# Patient Record
Sex: FEMALE | Race: WHITE | NOT HISPANIC OR LATINO | Employment: OTHER | URBAN - METROPOLITAN AREA
[De-identification: names, ages, dates, MRNs, and addresses within clinical notes are randomized per-mention and may not be internally consistent; named-entity substitution may affect disease eponyms.]

---

## 2023-02-15 RX ORDER — MULTIVITAMIN
1 TABLET ORAL DAILY
COMMUNITY

## 2023-02-15 RX ORDER — LISINOPRIL 20 MG/1
20 TABLET ORAL DAILY
COMMUNITY
Start: 2020-08-24 | End: 2023-03-16 | Stop reason: SDUPTHER

## 2023-02-15 RX ORDER — ACETAMINOPHEN 160 MG/5ML
200 SUSPENSION, ORAL (FINAL DOSE FORM) ORAL DAILY
COMMUNITY
End: 2024-02-13 | Stop reason: WASHOUT

## 2023-02-15 RX ORDER — CALCIUM/MAGNESIUM 300-300 MG
1 TABLET ORAL DAILY
COMMUNITY

## 2023-02-15 RX ORDER — EZETIMIBE 10 MG/1
10 TABLET ORAL EVERY OTHER DAY
COMMUNITY
End: 2023-03-16 | Stop reason: SDUPTHER

## 2023-02-15 RX ORDER — LEVOTHYROXINE SODIUM 50 UG/1
50 TABLET ORAL DAILY
COMMUNITY
Start: 2017-12-11 | End: 2023-04-03 | Stop reason: SDUPTHER

## 2023-02-15 RX ORDER — GLUCOSAMINE/CHONDR SU A SOD 167-133 MG
500 CAPSULE ORAL
COMMUNITY

## 2023-02-15 RX ORDER — METRONIDAZOLE 7.5 MG/G
1 GEL TOPICAL 2 TIMES DAILY
COMMUNITY
Start: 2016-07-25 | End: 2023-04-03 | Stop reason: SDUPTHER

## 2023-02-27 PROBLEM — K63.5 COLON POLYPS: Status: ACTIVE | Noted: 2023-02-27

## 2023-02-27 PROBLEM — R73.01 FASTING HYPERGLYCEMIA: Status: ACTIVE | Noted: 2023-02-27

## 2023-02-27 PROBLEM — D12.5 ADENOMATOUS POLYP OF SIGMOID COLON: Status: ACTIVE | Noted: 2023-02-27

## 2023-02-27 PROBLEM — M79.672 PAIN OF LEFT HEEL: Status: ACTIVE | Noted: 2023-02-27

## 2023-02-27 PROBLEM — I10 BENIGN ESSENTIAL HTN: Status: ACTIVE | Noted: 2023-02-27

## 2023-02-27 PROBLEM — E06.9 THYROIDITIS: Status: ACTIVE | Noted: 2023-02-27

## 2023-02-27 PROBLEM — M19.039 DJD (DEGENERATIVE JOINT DISEASE) OF WRIST: Status: ACTIVE | Noted: 2023-02-27

## 2023-02-27 PROBLEM — M62.830 BACK MUSCLE SPASM: Status: ACTIVE | Noted: 2023-02-27

## 2023-02-27 PROBLEM — E78.00 ELEVATED LOW DENSITY LIPOPROTEIN (LDL) CHOLESTEROL LEVEL: Status: ACTIVE | Noted: 2023-02-27

## 2023-02-27 PROBLEM — E06.3 HYPOTHYROIDISM DUE TO HASHIMOTO'S THYROIDITIS: Status: ACTIVE | Noted: 2023-02-27

## 2023-02-27 PROBLEM — H69.91 DYSFUNCTION OF RIGHT EUSTACHIAN TUBE: Status: ACTIVE | Noted: 2023-02-27

## 2023-02-27 PROBLEM — L71.9 ROSACEA: Status: ACTIVE | Noted: 2023-02-27

## 2023-02-27 PROBLEM — N18.31 STAGE 3A CHRONIC KIDNEY DISEASE (MULTI): Status: ACTIVE | Noted: 2023-02-27

## 2023-02-27 PROBLEM — E66.811 OBESITY, CLASS I, BMI 30.0-34.9 (SEE ACTUAL BMI): Status: ACTIVE | Noted: 2023-02-27

## 2023-02-27 PROBLEM — M85.88 OSTEOPENIA OF LUMBAR SPINE: Status: ACTIVE | Noted: 2023-02-27

## 2023-02-27 PROBLEM — E03.8 HYPOTHYROIDISM DUE TO HASHIMOTO'S THYROIDITIS: Status: ACTIVE | Noted: 2023-02-27

## 2023-02-27 PROBLEM — M85.852 OSTEOPENIA OF LEFT HIP: Status: ACTIVE | Noted: 2023-02-27

## 2023-02-27 PROBLEM — M41.9 SCOLIOSIS: Status: ACTIVE | Noted: 2023-02-27

## 2023-02-27 PROBLEM — M19.042 OSTEOARTHRITIS OF FINGER OF LEFT HAND: Status: ACTIVE | Noted: 2023-02-27

## 2023-02-27 PROBLEM — E78.5 HYPERLIPIDEMIA: Status: ACTIVE | Noted: 2023-02-27

## 2023-02-27 PROBLEM — Z91.199 NON-COMPLIANCE: Status: ACTIVE | Noted: 2023-02-27

## 2023-02-27 PROBLEM — R01.1 SYSTOLIC MURMUR: Status: ACTIVE | Noted: 2023-02-27

## 2023-02-27 PROBLEM — E66.9 OBESITY, CLASS I, BMI 30.0-34.9 (SEE ACTUAL BMI): Status: ACTIVE | Noted: 2023-02-27

## 2023-02-27 RX ORDER — VIT C/E/ZN/COPPR/LUTEIN/ZEAXAN 250MG-90MG
1 CAPSULE ORAL DAILY
COMMUNITY

## 2023-03-10 LAB
ALANINE AMINOTRANSFERASE (SGPT) (U/L) IN SER/PLAS: 15 U/L (ref 7–45)
ALBUMIN (G/DL) IN SER/PLAS: 4.2 G/DL (ref 3.4–5)
ALBUMIN (MG/L) IN URINE: <7 MG/L
ALBUMIN/CREATININE (UG/MG) IN URINE: NORMAL UG/MG CRT (ref 0–30)
ALKALINE PHOSPHATASE (U/L) IN SER/PLAS: 55 U/L (ref 33–136)
ANION GAP IN SER/PLAS: 10 MMOL/L (ref 10–20)
ASPARTATE AMINOTRANSFERASE (SGOT) (U/L) IN SER/PLAS: 17 U/L (ref 9–39)
BILIRUBIN TOTAL (MG/DL) IN SER/PLAS: 0.3 MG/DL (ref 0–1.2)
CALCIDIOL (25 OH VITAMIN D3) (NG/ML) IN SER/PLAS: 60 NG/ML
CALCIUM (MG/DL) IN SER/PLAS: 9.9 MG/DL (ref 8.6–10.6)
CARBON DIOXIDE, TOTAL (MMOL/L) IN SER/PLAS: 29 MMOL/L (ref 21–32)
CHLORIDE (MMOL/L) IN SER/PLAS: 106 MMOL/L (ref 98–107)
CHOLESTEROL (MG/DL) IN SER/PLAS: 267 MG/DL (ref 0–199)
CHOLESTEROL IN HDL (MG/DL) IN SER/PLAS: 66.2 MG/DL
CHOLESTEROL/HDL RATIO: 4
COBALAMIN (VITAMIN B12) (PG/ML) IN SER/PLAS: 910 PG/ML (ref 211–911)
CREATININE (MG/DL) IN SER/PLAS: 0.85 MG/DL (ref 0.5–1.05)
CREATININE (MG/DL) IN URINE: 99.1 MG/DL (ref 20–320)
ERYTHROCYTE DISTRIBUTION WIDTH (RATIO) BY AUTOMATED COUNT: 12.7 % (ref 11.5–14.5)
ERYTHROCYTE MEAN CORPUSCULAR HEMOGLOBIN CONCENTRATION (G/DL) BY AUTOMATED: 31.8 G/DL (ref 32–36)
ERYTHROCYTE MEAN CORPUSCULAR VOLUME (FL) BY AUTOMATED COUNT: 96 FL (ref 80–100)
ERYTHROCYTES (10*6/UL) IN BLOOD BY AUTOMATED COUNT: 4.84 X10E12/L (ref 4–5.2)
GFR FEMALE: 73 ML/MIN/1.73M2
GLUCOSE (MG/DL) IN SER/PLAS: 124 MG/DL (ref 74–99)
HEMATOCRIT (%) IN BLOOD BY AUTOMATED COUNT: 46.6 % (ref 36–46)
HEMOGLOBIN (G/DL) IN BLOOD: 14.8 G/DL (ref 12–16)
LDL: 179 MG/DL (ref 0–99)
LEUKOCYTES (10*3/UL) IN BLOOD BY AUTOMATED COUNT: 6.9 X10E9/L (ref 4.4–11.3)
NRBC (PER 100 WBCS) BY AUTOMATED COUNT: 0 /100 WBC (ref 0–0)
PLATELETS (10*3/UL) IN BLOOD AUTOMATED COUNT: 295 X10E9/L (ref 150–450)
POTASSIUM (MMOL/L) IN SER/PLAS: 4.1 MMOL/L (ref 3.5–5.3)
PROTEIN TOTAL: 7.1 G/DL (ref 6.4–8.2)
SODIUM (MMOL/L) IN SER/PLAS: 141 MMOL/L (ref 136–145)
THYROTROPIN (MIU/L) IN SER/PLAS BY DETECTION LIMIT <= 0.05 MIU/L: 4.32 MIU/L (ref 0.44–3.98)
THYROXINE (T4) FREE (NG/DL) IN SER/PLAS: 1.22 NG/DL (ref 0.78–1.48)
TRIGLYCERIDE (MG/DL) IN SER/PLAS: 109 MG/DL (ref 0–149)
UREA NITROGEN (MG/DL) IN SER/PLAS: 24 MG/DL (ref 6–23)
VLDL: 22 MG/DL (ref 0–40)

## 2023-03-16 DIAGNOSIS — I10 BENIGN ESSENTIAL HTN: Primary | ICD-10-CM

## 2023-03-16 DIAGNOSIS — E78.5 HYPERLIPIDEMIA, UNSPECIFIED HYPERLIPIDEMIA TYPE: ICD-10-CM

## 2023-03-16 RX ORDER — EZETIMIBE 10 MG/1
10 TABLET ORAL EVERY OTHER DAY
Qty: 30 TABLET | Refills: 0 | Status: SHIPPED | OUTPATIENT
Start: 2023-03-16 | End: 2023-04-03 | Stop reason: SDUPTHER

## 2023-03-16 RX ORDER — LISINOPRIL 20 MG/1
20 TABLET ORAL DAILY
Qty: 30 TABLET | Refills: 0 | Status: SHIPPED | OUTPATIENT
Start: 2023-03-16 | End: 2023-04-03 | Stop reason: SDUPTHER

## 2023-04-03 ENCOUNTER — OFFICE VISIT (OUTPATIENT)
Dept: PRIMARY CARE | Facility: CLINIC | Age: 73
End: 2023-04-03
Payer: COMMERCIAL

## 2023-04-03 VITALS
WEIGHT: 179.7 LBS | HEART RATE: 72 BPM | DIASTOLIC BLOOD PRESSURE: 78 MMHG | HEIGHT: 63 IN | RESPIRATION RATE: 22 BRPM | SYSTOLIC BLOOD PRESSURE: 140 MMHG | BODY MASS INDEX: 31.84 KG/M2

## 2023-04-03 DIAGNOSIS — E03.8 HYPOTHYROIDISM DUE TO HASHIMOTO'S THYROIDITIS: ICD-10-CM

## 2023-04-03 DIAGNOSIS — E78.2 MIXED HYPERLIPIDEMIA: ICD-10-CM

## 2023-04-03 DIAGNOSIS — N18.31 STAGE 3A CHRONIC KIDNEY DISEASE (MULTI): ICD-10-CM

## 2023-04-03 DIAGNOSIS — R73.01 FASTING HYPERGLYCEMIA: ICD-10-CM

## 2023-04-03 DIAGNOSIS — L71.9 ROSACEA: ICD-10-CM

## 2023-04-03 DIAGNOSIS — N81.4 UTERINE PROLAPSE: ICD-10-CM

## 2023-04-03 DIAGNOSIS — Z12.31 VISIT FOR SCREENING MAMMOGRAM: ICD-10-CM

## 2023-04-03 DIAGNOSIS — I10 BENIGN ESSENTIAL HTN: ICD-10-CM

## 2023-04-03 DIAGNOSIS — R89.9 ABNORMAL LABORATORY TEST RESULT: ICD-10-CM

## 2023-04-03 DIAGNOSIS — Z00.01 ANNUAL VISIT FOR GENERAL ADULT MEDICAL EXAMINATION WITH ABNORMAL FINDINGS: Primary | ICD-10-CM

## 2023-04-03 DIAGNOSIS — E78.00 ELEVATED LOW DENSITY LIPOPROTEIN (LDL) CHOLESTEROL LEVEL: ICD-10-CM

## 2023-04-03 DIAGNOSIS — E78.5 HYPERLIPIDEMIA, UNSPECIFIED HYPERLIPIDEMIA TYPE: ICD-10-CM

## 2023-04-03 DIAGNOSIS — E06.3 HYPOTHYROIDISM DUE TO HASHIMOTO'S THYROIDITIS: ICD-10-CM

## 2023-04-03 PROCEDURE — 1170F FXNL STATUS ASSESSED: CPT | Performed by: INTERNAL MEDICINE

## 2023-04-03 PROCEDURE — 3077F SYST BP >= 140 MM HG: CPT | Performed by: INTERNAL MEDICINE

## 2023-04-03 PROCEDURE — 1157F ADVNC CARE PLAN IN RCRD: CPT | Performed by: INTERNAL MEDICINE

## 2023-04-03 PROCEDURE — 3078F DIAST BP <80 MM HG: CPT | Performed by: INTERNAL MEDICINE

## 2023-04-03 PROCEDURE — 99397 PER PM REEVAL EST PAT 65+ YR: CPT | Performed by: INTERNAL MEDICINE

## 2023-04-03 PROCEDURE — 1160F RVW MEDS BY RX/DR IN RCRD: CPT | Performed by: INTERNAL MEDICINE

## 2023-04-03 PROCEDURE — 1159F MED LIST DOCD IN RCRD: CPT | Performed by: INTERNAL MEDICINE

## 2023-04-03 PROCEDURE — 99213 OFFICE O/P EST LOW 20 MIN: CPT | Performed by: INTERNAL MEDICINE

## 2023-04-03 PROCEDURE — 1036F TOBACCO NON-USER: CPT | Performed by: INTERNAL MEDICINE

## 2023-04-03 RX ORDER — LISINOPRIL 20 MG/1
20 TABLET ORAL DAILY
Qty: 90 TABLET | Refills: 3 | Status: SHIPPED | OUTPATIENT
Start: 2023-04-03 | End: 2023-04-20

## 2023-04-03 RX ORDER — LEVOTHYROXINE SODIUM 50 UG/1
50 TABLET ORAL DAILY
Qty: 90 TABLET | Refills: 3 | Status: SHIPPED | OUTPATIENT
Start: 2023-04-03 | End: 2024-04-23

## 2023-04-03 RX ORDER — METRONIDAZOLE 7.5 MG/G
1 GEL TOPICAL 2 TIMES DAILY
Qty: 45 G | Refills: 3 | Status: SHIPPED | OUTPATIENT
Start: 2023-04-03

## 2023-04-03 RX ORDER — EZETIMIBE 10 MG/1
10 TABLET ORAL EVERY OTHER DAY
Qty: 45 TABLET | Refills: 3 | Status: SHIPPED | OUTPATIENT
Start: 2023-04-03 | End: 2024-04-23

## 2023-04-03 ASSESSMENT — VISUAL ACUITY
OD_CC: 20/20
OS_CC: 20/25

## 2023-04-03 ASSESSMENT — ACTIVITIES OF DAILY LIVING (ADL)
FEEDING YOURSELF: INDEPENDENT
HEARING - RIGHT EAR: FUNCTIONAL
HEARING - LEFT EAR: FUNCTIONAL
BATHING: INDEPENDENT
ADEQUATE_TO_COMPLETE_ADL: YES
WALKS IN HOME: INDEPENDENT
DRESSING YOURSELF: INDEPENDENT
TOILETING: INDEPENDENT
ASSISTIVE_DEVICE: EYEGLASSES
PATIENT'S MEMORY ADEQUATE TO SAFELY COMPLETE DAILY ACTIVITIES?: YES
GROOMING: INDEPENDENT
JUDGMENT_ADEQUATE_SAFELY_COMPLETE_DAILY_ACTIVITIES: YES

## 2023-04-03 ASSESSMENT — ENCOUNTER SYMPTOMS
LOSS OF SENSATION IN FEET: 0
DEPRESSION: 0
OCCASIONAL FEELINGS OF UNSTEADINESS: 0

## 2023-04-03 ASSESSMENT — PAIN SCALES - GENERAL: PAINLEVEL: 0-NO PAIN

## 2023-04-03 ASSESSMENT — PATIENT HEALTH QUESTIONNAIRE - PHQ9
1. LITTLE INTEREST OR PLEASURE IN DOING THINGS: NOT AT ALL
SUM OF ALL RESPONSES TO PHQ9 QUESTIONS 1 AND 2: 0
2. FEELING DOWN, DEPRESSED OR HOPELESS: NOT AT ALL

## 2023-04-03 ASSESSMENT — COLUMBIA-SUICIDE SEVERITY RATING SCALE - C-SSRS
2. HAVE YOU ACTUALLY HAD ANY THOUGHTS OF KILLING YOURSELF?: NO
6. HAVE YOU EVER DONE ANYTHING, STARTED TO DO ANYTHING, OR PREPARED TO DO ANYTHING TO END YOUR LIFE?: NO
1. IN THE PAST MONTH, HAVE YOU WISHED YOU WERE DEAD OR WISHED YOU COULD GO TO SLEEP AND NOT WAKE UP?: NO

## 2023-04-03 NOTE — PROGRESS NOTES
Subjective   Patient ID: Savanah Hernandez is a 72 y.o. female who presents for annual physical and follow up on conditions.  LAST VISIT PLAN 10/3/22  Patient Discussion/Summary     TESTING:Please obtain blood work as soon as possible. Your last thyroid medication monitoring test was one and 1/2 years ago, it is recommended at least every 12 months.     MEDICATIONS:  Vaccination against influenza is recommended.  Vaccination against covid 19 is recommended. Since you had covid in august, you should have protection against omicron B4/5 for a few months.     INSTRUCTIONS:  You are encouraged to drink 64 oz of water per day. 1 or 2 cups of herbal tea could be counted toward the water intake.     A diet that is low in sugars, refined grains and processed foods is recommended.  Exercise at least 30 minutes per day is also recommended.        FOLLOW UP WITH DR. WALTER:  Next visit:  annual march   continue bp monitoring at home.  Provider Impressions     htn Stable, no medication changes.Follow up planned, see patient discussion.  arthritic nodule wrist, observe, offered xray, but it does not bother her much, if worsens should xray,   talus djd nodule, discussed caution with achilles, would see specialist if changing to more supportive shoes did not help     CKD, stage 3a-unkonwn status, labs due 6 months ago, requested pt get these done. she is not taking nsaids.     HTN Stable bp and she monitors at home. suggest ck at least monthly. asymp.  , -labs were due in april for this, asked her to please complete  hypothyroid, no labs done for a year and 1/2, stressed importance of monitoring thyroid dose at least every 12 months.        Osteopenia -exercise vit d calcium, labs, dexa q2y  END INFO FROM PRIOR VISIT  HPI  She is here for her annual physical and follo wup on hypertension, high cholesterol,hypothyroidism, lab review, and medication review.  Health maintenance items last completed:  Colonoscopy: 7/2021-  polyps  Mammogram: 5/2022 - neg  Bone Density: 3/2022- osteopenia  Urine albumin if HTN or DM2: 3/10/23 - <7.0 mg/L  Pap: 2/2020- neg-always normal  Pelvic:2/2020=she would prefer not doing these as a screening exam anymore and i am ok with that.  Breast exam in office: today  Vaccines due or not completed: flu, covid, tdap (bad reaction to TD)  Last Health Maintenance exam: 3/30/22    Labs :  Chol is up: states she Was off zetia for couple of months and cholesterol is high.She is back on it now.CT scan for cac  score was zero 2022  Sugar is up.will ck a1c, normal in the past    She missed 7 days of her thyroid medication in the 2 months prior to her blood test which may explain the tsh up some so will keep dose the same and mack labs    She notes something has dropped in her vaginal area. Will ck on pelvic. No incontinence.    She requests refill on medication for her rosacea , it is working well.    Review of Systems  All systems reviewed and are negative unless otherwise stated in HPI.   Review of systems, written document, scanned in to office visit.  I reviewed 7 page history and review of systems form.    Objective    Latest Reference Range & Units 03/10/23 10:52   GLUCOSE 74 - 99 mg/dL 124 (H)   SODIUM 136 - 145 mmol/L 141   POTASSIUM 3.5 - 5.3 mmol/L 4.1   CHLORIDE 98 - 107 mmol/L 106   Bicarbonate 21 - 32 mmol/L 29   Anion Gap 10 - 20 mmol/L 10   Blood Urea Nitrogen 6 - 23 mg/dL 24 (H)   Creatinine 0.50 - 1.05 mg/dL 0.85   Calcium 8.6 - 10.6 mg/dL 9.9   Albumin 3.4 - 5.0 g/dL 4.2   Alkaline Phosphatase 33 - 136 U/L 55   ALT 7 - 45 U/L 15   AST 9 - 39 U/L 17   Bilirubin Total 0.0 - 1.2 mg/dL 0.3   HDL CHOLESTEROL mg/dL 66.2   Cholesterol/HDL Ratio  4.0   VLDL 0 - 40 mg/dL 22   TRIGLYCERIDES 0 - 149 mg/dL 109   Total Protein 6.4 - 8.2 g/dL 7.1   CHOLESTEROL 0 - 199 mg/dL 267 (H)   LDL 0 - 99 mg/dL 179 (H)   Vitamin B12 211 - 911 pg/mL 910   Thyroxine, Free 0.78 - 1.48 ng/dL 1.22   Thyroid Stimulating Hormone  "0.44 - 3.98 mIU/L 4.32 (H)   Vitamin D, 25-Hydroxy, Total ng/mL 60   WBC 4.4 - 11.3 x10E9/L 6.9   RBC 4.00 - 5.20 x10E12/L 4.84   HEMOGLOBIN 12.0 - 16.0 g/dL 14.8   HEMATOCRIT 36.0 - 46.0 % 46.6 (H)   MCV 80 - 100 fL 96   MCHC 32.0 - 36.0 g/dL 31.8 (L)   Platelets 150 - 450 x10E9/L 295   nRBC 0.0 - 0.0 /100 WBC 0.0   RED CELL DISTRIBUTION WIDTH 11.5 - 14.5 % 12.7   Creatinine, Urine Random 20.0 - 320.0 mg/dL 99.1   Albumin/Creatine Ratio 0.0 - 30.0 ug/mg crt SEE COMMENT   ALBUMIN (MG/L) IN URINE Not Established mg/L <7.0   GFR Female >90 mL/min/1.73m2 73   (H): Data is abnormally high  (L): Data is abnormally low      Physical Exam  /78 (BP Location: Left arm, Patient Position: Sitting, BP Cuff Size: Adult)   Pulse 72   Resp 22   Ht 1.6 m (5' 3\")   Wt 81.5 kg (179 lb 11.2 oz)   LMP 04/03/2003 (Approximate)   BMI 31.83 kg/m²     General: Patient is known to me, looks well, is in usual state of health, with  no obvious distress.  Head: normocephalic  Eyes: PERRL, EOMI, no scleral icterus or conjunctival abnormality  Ears:Normal canals and TM's  Oropharynx: Well hydrated mucosa. no lesions, erythema. palate moves well.--mask removed  Neck: No masses, no cervical (A/P/ or Lateral) adenopathy. Mildly prominent left submandibular gland, she had uri few weeks ago all symptoms resolved, was just ST an dpost nasal drainage. This is not a mass or worrisome. Not tender.  Thyroid not enlarged and no nodules. Carotid pulses normal and no bruits.  Chest: Normal AP diameter. No supraclavicular adenopathy.  Lungs: Clear to auscultation: no rales , wheezes, rhonchi, rubs, examined bilaterally, ant/post /lateral. RR normal.  Heart: RRR. No MGCR S1 S2 normal.  Abd: BS normal, no bruits. No hepatosplenomegaly. No abdominal mass or tenderness. No distension.  Extremities: No upper extremity edema. No lower leg edema.No ischemia.   Joints: no synovitis seen. No deformities.  Pulses: normal posterior tibialis pulses, normal " dorsalis pedis pulses. normal radial pulses. Normal femoral pulses without bruits.  Neuro: CN 2-12 intact . Alert, oriented, appropriate.  No focal weakness observed. No tremors. Ambulation is normal .  Psych: Mood and affect normal. No cognitive deficits noted during this exam. Alert, oriented, appropriate.  Skin: No rash, petechiae or excessive bruising.  Lymph: no SC axillary or inguinal adenopathy  Breast Exam : Symmetric appearance. No masses, nipple discharge, skin retraction, axillary or supraclavicular adenopathy.  Gyn: pelvic exam: External Genitalia without lesions. Urethra normal. Speculum exam: no lesions or vaginal discharge, cervix without lesions. Bimanual exam: no uterine masses. No ovarian masses. No anal/perineal lesions. Recto vaginal exam normal. (Pt declined chaperone)  Moderately severe uterine prolapse that is reducible and cystocoele noted.          Assessment/Plan   Problem List Items Addressed This Visit          Circulatory    Benign essential HTN    Relevant Medications    lisinopril 20 mg tablet       Genitourinary    Stage 3a chronic kidney disease       Endocrine/Metabolic    Hypothyroidism due to Hashimoto's thyroiditis    Relevant Medications    levothyroxine (Synthroid, Levoxyl) 50 mcg tablet       Other    Elevated low density lipoprotein (LDL) cholesterol level    Fasting hyperglycemia    Relevant Orders    Hemoglobin A1c    Hyperlipidemia    Relevant Medications    ezetimibe (Zetia) 10 mg tablet    Other Relevant Orders    Lipid Panel    Aspartate Aminotransferase    Alanine Aminotransferase    TSH with reflex to Free T4 if abnormal    Rosacea    Relevant Medications    metroNIDAZOLE (Metrogel) 0.75 % gel     Other Visit Diagnoses       Annual visit for general adult medical examination with abnormal findings    -  Primary    Uterine prolapse        Relevant Orders    Referral to Urogynecology    Visit for screening mammogram        Relevant Orders    BI mammo bilateral screening  tomosynthesis          Annual  history and physical completed including  ROS, PE, review of chronic issues,   immunizations,   results of testing   and health maintenance.  Functionality and pain were assessed.   Cancer screening testing was addressed as appropriate-see patient discussion/orders.   Medications were discussed and reviewed/reconciled and refill need assessed/handled.   Full code.  No alcohol or depression red flags, screens neg.  Medical issues addressed as noted above.  She will get back on track with meds and diet and we will re assess blood work in a few months.    See wrap up section for patient instructions and  additional treatment plan.  We discussed diet for sugar and fats and obesity.  We discussed options for treating prolapse and not all are surgical (she is disinclined for surgical correction), see referral notes. She preferred seeing a female practitioner.

## 2023-04-03 NOTE — PATIENT INSTRUCTIONS
Thank you for coming in for your annual and getting your blood work done.    Restart zetia 10 mg every other day.  We talked about the increased cardiovascular risk of over 20% and recommending a statin medication for cholesterol and to protect blood vessels. You indicated you would think about this.    Thyroid test was mildly off but likely was from missing some of your medication in the past few months, so no medication dose change, keep same dose.     Recheck lipid panel, sugar and thyroid test in 3-4 months.    Mammogram due in may     Follow up in 4 months.  Watch diet for sugar and animal fats.    Referral to calvin Alas.

## 2023-04-04 NOTE — TELEPHONE ENCOUNTER
I called [facility] pharmacy spoke to the pharmacist and she states that the medications are just to soon to fill she last filled meds for 30 day supply on 3/17/23.

## 2023-04-04 NOTE — TELEPHONE ENCOUNTER
I called and notified the pt and suggested she reach out to the pharmacy as to exactly when she can  her medications.

## 2023-04-04 NOTE — TELEPHONE ENCOUNTER
Pt called she stated on my chart that the meds she requested for renewal yesterday at her appoint were unavailable.

## 2023-04-16 PROBLEM — R89.9 ABNORMAL LABORATORY TEST RESULT: Status: ACTIVE | Noted: 2023-04-16

## 2023-07-07 ENCOUNTER — LAB (OUTPATIENT)
Dept: LAB | Facility: LAB | Age: 73
End: 2023-07-07
Payer: COMMERCIAL

## 2023-07-07 DIAGNOSIS — E78.2 MIXED HYPERLIPIDEMIA: ICD-10-CM

## 2023-07-07 DIAGNOSIS — R73.01 FASTING HYPERGLYCEMIA: ICD-10-CM

## 2023-07-07 PROCEDURE — 36415 COLL VENOUS BLD VENIPUNCTURE: CPT

## 2023-07-07 PROCEDURE — 84460 ALANINE AMINO (ALT) (SGPT): CPT

## 2023-07-07 PROCEDURE — 83036 HEMOGLOBIN GLYCOSYLATED A1C: CPT

## 2023-07-07 PROCEDURE — 84450 TRANSFERASE (AST) (SGOT): CPT

## 2023-07-07 PROCEDURE — 84443 ASSAY THYROID STIM HORMONE: CPT

## 2023-07-07 PROCEDURE — 80061 LIPID PANEL: CPT

## 2023-07-08 LAB
ALANINE AMINOTRANSFERASE (SGPT) (U/L) IN SER/PLAS: 15 U/L (ref 7–45)
ASPARTATE AMINOTRANSFERASE (SGOT) (U/L) IN SER/PLAS: 20 U/L (ref 9–39)
CHOLESTEROL (MG/DL) IN SER/PLAS: 242 MG/DL (ref 0–199)
CHOLESTEROL IN HDL (MG/DL) IN SER/PLAS: 61.7 MG/DL
CHOLESTEROL/HDL RATIO: 3.9
ESTIMATED AVERAGE GLUCOSE FOR HBA1C: 111 MG/DL
HEMOGLOBIN A1C/HEMOGLOBIN TOTAL IN BLOOD: 5.5 %
LDL: 159 MG/DL (ref 0–99)
THYROTROPIN (MIU/L) IN SER/PLAS BY DETECTION LIMIT <= 0.05 MIU/L: 3.06 MIU/L (ref 0.44–3.98)
TRIGLYCERIDE (MG/DL) IN SER/PLAS: 108 MG/DL (ref 0–149)
VLDL: 22 MG/DL (ref 0–40)

## 2023-07-12 NOTE — RESULT ENCOUNTER NOTE
TSH is  mildly off, will follow. Cholesterol elevated but is improving. Ast alt normal, A1C normal.Has August appt. To review.

## 2023-08-01 NOTE — PROGRESS NOTES
Subjective   Patient ID: Savanah Hernandez is a 72 y.o. female who presents for Follow-up (Pt here for follow up and review. Pt denies any new problems or concerns at this time).  LAST VISIT PLAN 4/03/2023  PATIENT'S DISCUSSION/SUMMARY:  Thank you for coming in for your annual and getting your blood work done.  Restart zetia 10 mg every other day.  We talked about the increased cardiovascular risk of over 20% and recommending a statin medication for cholesterol and to protect blood vessels. You indicated you would think about this.  Thyroid test was mildly off but likely was from missing some of your medication in the past few months, so no medication dose change, keep same dose.   Recheck lipid panel, sugar and thyroid test in 3-4 months.  Mammogram due in may   Follow up in 4 months.  Watch diet for sugar and animal fats.  Referral to calvin Alas.      PROVIDER'S IMPRESSIONS:  Annual visit for general adult medical examination with abnormal findings  Uterine prolapse  Benign essential HTN  Stage 3a chronic kidney disease  Hypothyroidism due to Hashimoto's thyroiditis  Elevated low density lipoprotein (LDL) cholesterol level  Fasting hyperglycemia  Mixed hyperlipidemia  Hyperlipidemia, unspecified hyperlipidemia type  Rosacea  Visit for screening mammogram  Abnormal laboratory test result  Orders Placed  Lipid Panel  Alanine Aminotransferase  Aspartate Aminotransferase  Hemoglobin A1c  TSH with reflex to Free T4 if abnormal  BI mammo bilateral screening tomosynthesis  Referral to Urogynecology Authorized  Encounters with Related Results  Lab (7/7/2023)  END INFO FROM PRIOR VISIT    HPI  Here to follow up on labs bp, meds.  July 2023:TSH is  mildly off, will follow. Cholesterol elevated but is improving. Ast alt normal, A1C normal.Has August appt. To review.MAMM 5/2023, CT CARDIAC 6/6/22, DEXA 3/10/22,     Did see aleksandr shelton in urology may 2023-consideration pessary surgery estrogen cream, has rx for cream,  wanted to monitor otherwise as not many urinary issues.    Feels well, no complaints.  Scribe Transcription:  She states she will be moving soon to be near her daughter in [place].     Cardiac: No CP , palpitations, increased gates  Resp: No sob, wheezing, cough  Extremities: No leg or ankle swelling, no claudication  Neuro: No dizziness, syncope, blurred vision. No new headaches.     Review of Systems    Objective   Lab Results   Component Value Date    WBC 6.9 03/10/2023    HGB 14.8 03/10/2023    HCT 46.6 (H) 03/10/2023     03/10/2023    CHOL 242 (H) 07/07/2023    TRIG 108 07/07/2023    HDL 61.7 07/07/2023    ALT 15 07/07/2023    AST 20 07/07/2023     03/10/2023    K 4.1 03/10/2023     03/10/2023    CREATININE 0.85 03/10/2023    BUN 24 (H) 03/10/2023    CO2 29 03/10/2023    TSH 3.06 07/07/2023    HGBA1C 5.5 07/07/2023         TSH with reflex to Free T4 if abnormal               Component Ref Range & Units 1 mo ago  (7/7/23) 5 mo ago  (3/10/23) 2 yr ago  (1/27/21) 3 yr ago  (12/20/19) 4 yr ago  (1/15/19) 5 yr ago  (4/17/18) 5 yr ago  (11/17/17)   TSH 0.44 - 3.98 mIU/L 3.06 4.32 High  CM 3.08 CM 1.73 CM 2.30 CM 1.68 CM 3.82 CM   Comment:  TSH testing is performed using different testing   methodology at [facility] than at other   [facility] hospitals. Direct result comparisons should   only be made within the same method.   [facility]  [facility]          Ldl 159 July             Component Ref Range & Units 1 mo ago  (7/7/23) 5 mo ago  (3/10/23) 2 yr ago  (4/23/21) 2 yr ago  (8/11/20) 3 yr ago  (12/20/19) 5 yr ago  (4/17/18) 5 yr ago  (11/17/17)   Cholesterol 0 - 199 mg/dL 242 High  267 High   High   High   High   High   High  CM      HDL mg/dL 61.7 66.2 CM 59.3 CM 57.9 CM 61.1 CM 56.9 CM 55.5 CM   Comment: .      AGE      VERY LOW   LOW     NORMAL    HIGH       0-19 Y       < 35   < 40     40-45     ----    20-24 Y        "----   < 40       >45     ----      >24 Y       ----   < 40     40-60      >60  .   Cholesterol/HDL Ratio  3.9 4.0 CM 3.6 CM 4.0 CM 3.8 CM 5.0 CM 4.7 CM   Comment: REF VALUES  DESIRABLE  < 3.4  HIGH RISK  > 5.0   LDL 0 - 99 mg/dL 159 High  179 High   High   High   High   High   High  CM   Comment: .      Has declined statin. Is aware of inc cv risk discussed last visit.Fortunately CAC score is zero.     Latest Reference Range & Units 07/07/23 11:52   ALT 7 - 45 U/L 15   AST 9 - 39 U/L 20   HDL CHOLESTEROL mg/dL 61.7   Cholesterol/HDL Ratio  3.9   VLDL 0 - 40 mg/dL 22   TRIGLYCERIDES 0 - 149 mg/dL 108   CHOLESTEROL 0 - 199 mg/dL 242 (H)   LDL 0 - 99 mg/dL 159 (H)   Hemoglobin A1C % 5.5   Thyroid Stimulating Hormone 0.44 - 3.98 mIU/L 3.06   Estimated Average Glucose MG/     Normal A1c is stable.  Physical ExamBP 138/78 (BP Location: Right arm, Patient Position: Sitting, BP Cuff Size: Large adult)   Pulse 66   Resp 18   Ht 1.6 m (5' 3\")   Wt 80.3 kg (177 lb)   LMP 04/03/2003 (Approximate)   BMI 31.35 kg/m²       10/3/2022     1:40 PM 10/3/2022     2:07 PM 4/3/2023     2:17 PM 4/3/2023     2:22 PM 4/3/2023     3:32 PM 5/9/2023     1:03 PM 8/8/2023     1:49 PM   Vitals   Systolic 142 137 187 184 140 159 138   Diastolic 84 80 96 93 78 72 78   Heart Rate 68  85 80 72 71 66   Temp      36 °C (96.8 °F)    Resp 18  22    18   Height (in)   1.6 m (5' 3\")   1.6 m (5' 3\") 1.6 m (5' 3\")   Weight (lb) 178  179.7   179.5 177   BMI 30.55 kg/m2  31.83 kg/m2   31.8 kg/m2 31.35 kg/m2   BSA (m2) 1.91 m2  1.9 m2   1.9 m2 1.89 m2   Visit Report   Report Report Report  Report       Patient looks well and is in no obvious distress.  HEENT:   Normocephalic, no facial asymmetry  Eyes: Sclera and conjunctiva are clear.  Neck: No adenopathy cervical (Ant/post/lat), no Supraclavicular nodes.   Thyroid normal.  Carotid pulses normal, no carotid bruits.  Lungs : RR normal. Clear to auscultation anterior, " posterior and lateral. No rales, wheezes rhonchi or rubs. Good air exchange.  Heart: RRR. No Murmur, gallop, click or rub.  Vascular:  Posterior tibialis  pulses within normal limits bilaterally.   Extremities: no upper extremity edema. No lower extremity edema.   Musculoskeletal: no synovitis of joints seen. No new deformity.  Neuro: CN 2-12 intact. Alert, appropriate.  Ambulates independently.  No gross motor deficit.   No tremors.  Psych: normal mood and affect.  Skin: No rash, bruising petechiae or jaundice.    Assessment/Plan   Problem List Items Addressed This Visit       Benign essential HTN - Primary  stable.    Relevant Orders    CBC and Auto Differential    Comprehensive Metabolic Panel    Albumin , Urine Random    Elevated low density lipoprotein (LDL) cholesterol level    Pt prefers to continue to work on dietary treatment of her elevated cholesterol.    Relevant Orders    Lipid Panel    Cholesterol, LDL Direct    Fasting hyperglycemia    Relevant Orders    Hemoglobin A1C    Hypothyroidism due to Hashimoto's thyroiditis   tsh now euthyroid on present dose. Will follow.    Relevant Orders    TSH with reflex to Free T4 if abnormal    Vitamin B12    Stage 3a chronic kidney disease (CMS/HCC)    Relevant Orders    Vitamin D, Total      Pt instructions: Continue medications.  Watch salt, keep up with hydration and exercise.  Check blood pressure monthly: Goal BP at home is 120/70.  Heart Healthy/DASH diet info sent to your email.    Follow up in 6 months for annual and wellness.  Fasting blood test the week before that visit and urine test at the lab.

## 2023-08-08 ENCOUNTER — OFFICE VISIT (OUTPATIENT)
Dept: PRIMARY CARE | Facility: CLINIC | Age: 73
End: 2023-08-08
Payer: COMMERCIAL

## 2023-08-08 VITALS
WEIGHT: 177 LBS | RESPIRATION RATE: 18 BRPM | HEART RATE: 66 BPM | SYSTOLIC BLOOD PRESSURE: 138 MMHG | BODY MASS INDEX: 31.36 KG/M2 | DIASTOLIC BLOOD PRESSURE: 78 MMHG | HEIGHT: 63 IN

## 2023-08-08 DIAGNOSIS — E06.3 HYPOTHYROIDISM DUE TO HASHIMOTO'S THYROIDITIS: ICD-10-CM

## 2023-08-08 DIAGNOSIS — I10 BENIGN ESSENTIAL HTN: Primary | ICD-10-CM

## 2023-08-08 DIAGNOSIS — R73.01 FASTING HYPERGLYCEMIA: ICD-10-CM

## 2023-08-08 DIAGNOSIS — N18.31 STAGE 3A CHRONIC KIDNEY DISEASE (MULTI): ICD-10-CM

## 2023-08-08 DIAGNOSIS — E78.00 ELEVATED LOW DENSITY LIPOPROTEIN (LDL) CHOLESTEROL LEVEL: ICD-10-CM

## 2023-08-08 DIAGNOSIS — E03.8 HYPOTHYROIDISM DUE TO HASHIMOTO'S THYROIDITIS: ICD-10-CM

## 2023-08-08 PROCEDURE — 99214 OFFICE O/P EST MOD 30 MIN: CPT | Performed by: INTERNAL MEDICINE

## 2023-08-08 PROCEDURE — 3075F SYST BP GE 130 - 139MM HG: CPT | Performed by: INTERNAL MEDICINE

## 2023-08-08 PROCEDURE — 1126F AMNT PAIN NOTED NONE PRSNT: CPT | Performed by: INTERNAL MEDICINE

## 2023-08-08 PROCEDURE — 3078F DIAST BP <80 MM HG: CPT | Performed by: INTERNAL MEDICINE

## 2023-08-08 PROCEDURE — 1036F TOBACCO NON-USER: CPT | Performed by: INTERNAL MEDICINE

## 2023-08-08 PROCEDURE — 1160F RVW MEDS BY RX/DR IN RCRD: CPT | Performed by: INTERNAL MEDICINE

## 2023-08-08 PROCEDURE — 1157F ADVNC CARE PLAN IN RCRD: CPT | Performed by: INTERNAL MEDICINE

## 2023-08-08 PROCEDURE — 1159F MED LIST DOCD IN RCRD: CPT | Performed by: INTERNAL MEDICINE

## 2023-08-08 RX ORDER — ESTRADIOL 0.1 MG/G
2 CREAM VAGINAL DAILY
COMMUNITY
Start: 2023-05-09

## 2023-08-08 ASSESSMENT — PAIN SCALES - GENERAL: PAINLEVEL: 0-NO PAIN

## 2023-11-09 ENCOUNTER — OFFICE VISIT (OUTPATIENT)
Dept: UROLOGY | Facility: CLINIC | Age: 73
End: 2023-11-09
Payer: COMMERCIAL

## 2023-11-09 VITALS
WEIGHT: 175 LBS | SYSTOLIC BLOOD PRESSURE: 138 MMHG | DIASTOLIC BLOOD PRESSURE: 77 MMHG | BODY MASS INDEX: 31.01 KG/M2 | TEMPERATURE: 98.2 F | HEIGHT: 63 IN | HEART RATE: 81 BPM

## 2023-11-09 DIAGNOSIS — N81.4 CYSTOCELE WITH PROLAPSE: ICD-10-CM

## 2023-11-09 DIAGNOSIS — N95.8 GENITOURINARY SYNDROME OF MENOPAUSE: ICD-10-CM

## 2023-11-09 DIAGNOSIS — N81.6 RECTOCELE: ICD-10-CM

## 2023-11-09 DIAGNOSIS — N18.30 STAGE 3 CHRONIC KIDNEY DISEASE, UNSPECIFIED WHETHER STAGE 3A OR 3B CKD (MULTI): ICD-10-CM

## 2023-11-09 LAB
POC APPEARANCE, URINE: CLEAR
POC BILIRUBIN, URINE: NEGATIVE
POC BLOOD, URINE: NEGATIVE
POC COLOR, URINE: YELLOW
POC GLUCOSE, URINE: NEGATIVE MG/DL
POC KETONES, URINE: ABNORMAL MG/DL
POC LEUKOCYTES, URINE: NEGATIVE
POC NITRITE,URINE: NEGATIVE
POC PH, URINE: 5.5 PH
POC PROTEIN, URINE: ABNORMAL MG/DL
POC SPECIFIC GRAVITY, URINE: >=1.03
POC UROBILINOGEN, URINE: 1 EU/DL

## 2023-11-09 PROCEDURE — 99213 OFFICE O/P EST LOW 20 MIN: CPT | Performed by: NURSE PRACTITIONER

## 2023-11-09 PROCEDURE — 1036F TOBACCO NON-USER: CPT | Performed by: NURSE PRACTITIONER

## 2023-11-09 PROCEDURE — 1160F RVW MEDS BY RX/DR IN RCRD: CPT | Performed by: NURSE PRACTITIONER

## 2023-11-09 PROCEDURE — 3078F DIAST BP <80 MM HG: CPT | Performed by: NURSE PRACTITIONER

## 2023-11-09 PROCEDURE — 3075F SYST BP GE 130 - 139MM HG: CPT | Performed by: NURSE PRACTITIONER

## 2023-11-09 PROCEDURE — 81003 URINALYSIS AUTO W/O SCOPE: CPT | Performed by: NURSE PRACTITIONER

## 2023-11-09 PROCEDURE — 1159F MED LIST DOCD IN RCRD: CPT | Performed by: NURSE PRACTITIONER

## 2023-11-09 PROCEDURE — 1126F AMNT PAIN NOTED NONE PRSNT: CPT | Performed by: NURSE PRACTITIONER

## 2023-11-09 NOTE — PATIENT INSTRUCTIONS
Discussed monitoring, pessary and surgical correction for prolapse  Emptying bladder, no UTIs, no OAB symptoms  Patient would like to continue to monitor for now  Using vaginal estrogen cream pea size amount couple x per week, has enough and refill at home  Follow up 1 year pelvic exam, PVR UA

## 2023-11-09 NOTE — PROGRESS NOTES
"11/09/23   [id number]    Pelvic exam, prolapse monitoring     Subjective      HPI Savanah Hernandez is a 73 y.o. female who presents for pelvic exam, monitoring; stage II cystocele noted previously in  year, decided last visit to monitor; since then no urinary symptoms, no UTIs, no hematuria, emptying bladder well, although she can feel prolapse at times more now, it is not outside introitus and she would like to continue to monitor for now;     3/10/23 creatinine 0.85    Dr. Puente is her primary care      [facility] patient    History reviewed   PMH: HTN, cholesterol, thyroid  PSH: no surgeries  FH: maternal grandmother breast cancer (older)  SH: retired [employer], never smoked           Objective     /77   Pulse 81   Temp 36.8 °C (98.2 °F)   Ht 1.6 m (5' 3\")   Wt 79.4 kg (175 lb)   LMP 04/03/2003 (Approximate)   BMI 31.00 kg/m²    Physical Exam  Genitourinary:     Comments: Stage II large cystocele, moderate rectocele, moderate atrophy  Neg CST lying down, maybe sl increase since last exam 6 mos ago w cystocele/rectocele        General: Appears comfortable and in no apparent distress, well nourished  Head: Normocephalic, atraumatic  Neck: trachea midline  Respiratory: respirations unlabored, no wheezes, and no use of accessory muscles  Cardiovascular: at rest no dyspnea, well perfused  Skin: no visible rashes or lesions  Neurologic: grossly intact, oriented to person, place, and time  Psychiatric: mood and affect appropriate  Musculoskeletal: in chair for appt. no difficulty w upper body movement        Assessment/Plan   Problem List Items Addressed This Visit    None  Visit Diagnoses       Stage 3 chronic kidney disease, unspecified whether stage 3a or 3b CKD (CMS/HCC)    -  Primary    Relevant Orders    POCT UA Automated manually resulted (Completed)    Post-Void Residual (Completed)          Orders Placed This Encounter   Procedures    Post-Void Residual    POCT UA Automated manually " resulted     Order Specific Question:   Release result to [employer]     Answer:   Immediate [1]      Discussed monitoring, pessary and surgical correction for prolapse  Emptying bladder, no UTIs, no OAB symptoms  Patient would like to continue to monitor for now  Using vaginal estrogen cream pea size amount couple x per week, has enough and refill at home  Follow up 1 year pelvic exam, PVR UA       Danita Gaston, APRN-CNP  Lab Results   Component Value Date    GLUCOSE 124 (H) 03/10/2023    CALCIUM 9.9 03/10/2023     03/10/2023    K 4.1 03/10/2023    CO2 29 03/10/2023     03/10/2023    BUN 24 (H) 03/10/2023    CREATININE 0.85 03/10/2023

## 2024-01-02 DIAGNOSIS — I10 BENIGN ESSENTIAL HTN: ICD-10-CM

## 2024-01-03 RX ORDER — LISINOPRIL 20 MG/1
20 TABLET ORAL DAILY
Qty: 90 TABLET | Refills: 0 | Status: SHIPPED | OUTPATIENT
Start: 2024-01-03 | End: 2024-02-13 | Stop reason: SDUPTHER

## 2024-01-15 ENCOUNTER — TELEPHONE (OUTPATIENT)
Dept: PRIMARY CARE | Facility: CLINIC | Age: 74
End: 2024-01-15
Payer: COMMERCIAL

## 2024-01-15 NOTE — TELEPHONE ENCOUNTER
Pt called and stated she has been experiencing right knee pain since the beginning of December, woke up one morning and had the pain. When pt walks she feels the pain, when she sits down she does not. Pt would like to make an appt with N[facility].   Pt # [phone]

## 2024-01-17 DIAGNOSIS — M25.561 RIGHT KNEE PAIN, UNSPECIFIED CHRONICITY: Primary | ICD-10-CM

## 2024-01-17 NOTE — TELEPHONE ENCOUNTER
Patient calling again. Has not heard back. Can she hear today. Leaving tomorrow and will not be back for a week.  [phone]

## 2024-01-30 ENCOUNTER — LAB (OUTPATIENT)
Dept: LAB | Facility: LAB | Age: 74
End: 2024-01-30
Payer: COMMERCIAL

## 2024-01-30 ENCOUNTER — HOSPITAL ENCOUNTER (OUTPATIENT)
Dept: RADIOLOGY | Facility: CLINIC | Age: 74
Discharge: HOME | End: 2024-01-30
Payer: COMMERCIAL

## 2024-01-30 ENCOUNTER — OFFICE VISIT (OUTPATIENT)
Dept: ORTHOPEDIC SURGERY | Facility: CLINIC | Age: 74
End: 2024-01-30
Payer: COMMERCIAL

## 2024-01-30 VITALS — BODY MASS INDEX: 30.12 KG/M2 | WEIGHT: 170 LBS | HEIGHT: 63 IN

## 2024-01-30 DIAGNOSIS — E78.00 ELEVATED LOW DENSITY LIPOPROTEIN (LDL) CHOLESTEROL LEVEL: ICD-10-CM

## 2024-01-30 DIAGNOSIS — R73.01 FASTING HYPERGLYCEMIA: ICD-10-CM

## 2024-01-30 DIAGNOSIS — E03.8 HYPOTHYROIDISM DUE TO HASHIMOTO'S THYROIDITIS: ICD-10-CM

## 2024-01-30 DIAGNOSIS — N18.31 STAGE 3A CHRONIC KIDNEY DISEASE (MULTI): ICD-10-CM

## 2024-01-30 DIAGNOSIS — M25.561 RIGHT KNEE PAIN, UNSPECIFIED CHRONICITY: ICD-10-CM

## 2024-01-30 DIAGNOSIS — I10 BENIGN ESSENTIAL HTN: ICD-10-CM

## 2024-01-30 DIAGNOSIS — E06.3 HYPOTHYROIDISM DUE TO HASHIMOTO'S THYROIDITIS: ICD-10-CM

## 2024-01-30 PROCEDURE — 36415 COLL VENOUS BLD VENIPUNCTURE: CPT

## 2024-01-30 PROCEDURE — 82306 VITAMIN D 25 HYDROXY: CPT

## 2024-01-30 PROCEDURE — 1159F MED LIST DOCD IN RCRD: CPT | Performed by: ORTHOPAEDIC SURGERY

## 2024-01-30 PROCEDURE — 84443 ASSAY THYROID STIM HORMONE: CPT

## 2024-01-30 PROCEDURE — 99203 OFFICE O/P NEW LOW 30 MIN: CPT | Performed by: ORTHOPAEDIC SURGERY

## 2024-01-30 PROCEDURE — 1157F ADVNC CARE PLAN IN RCRD: CPT | Performed by: ORTHOPAEDIC SURGERY

## 2024-01-30 PROCEDURE — 73564 X-RAY EXAM KNEE 4 OR MORE: CPT | Mod: RIGHT SIDE | Performed by: RADIOLOGY

## 2024-01-30 PROCEDURE — 80061 LIPID PANEL: CPT

## 2024-01-30 PROCEDURE — 73564 X-RAY EXAM KNEE 4 OR MORE: CPT | Mod: RT

## 2024-01-30 PROCEDURE — 1036F TOBACCO NON-USER: CPT | Performed by: ORTHOPAEDIC SURGERY

## 2024-01-30 PROCEDURE — 82570 ASSAY OF URINE CREATININE: CPT

## 2024-01-30 PROCEDURE — 1126F AMNT PAIN NOTED NONE PRSNT: CPT | Performed by: ORTHOPAEDIC SURGERY

## 2024-01-30 PROCEDURE — 80053 COMPREHEN METABOLIC PANEL: CPT

## 2024-01-30 PROCEDURE — 85025 COMPLETE CBC W/AUTO DIFF WBC: CPT

## 2024-01-30 PROCEDURE — 83036 HEMOGLOBIN GLYCOSYLATED A1C: CPT

## 2024-01-30 PROCEDURE — 84439 ASSAY OF FREE THYROXINE: CPT

## 2024-01-30 PROCEDURE — 82043 UR ALBUMIN QUANTITATIVE: CPT

## 2024-01-30 PROCEDURE — 83721 ASSAY OF BLOOD LIPOPROTEIN: CPT

## 2024-01-30 PROCEDURE — 82607 VITAMIN B-12: CPT

## 2024-01-31 LAB
25(OH)D3 SERPL-MCNC: 60 NG/ML (ref 30–100)
ALBUMIN SERPL BCP-MCNC: 4.5 G/DL (ref 3.4–5)
ALP SERPL-CCNC: 54 U/L (ref 33–136)
ALT SERPL W P-5'-P-CCNC: 14 U/L (ref 7–45)
ANION GAP SERPL CALC-SCNC: 17 MMOL/L (ref 10–20)
AST SERPL W P-5'-P-CCNC: 17 U/L (ref 9–39)
BASOPHILS # BLD AUTO: 0.04 X10*3/UL (ref 0–0.1)
BASOPHILS NFR BLD AUTO: 0.5 %
BILIRUB SERPL-MCNC: 0.6 MG/DL (ref 0–1.2)
BUN SERPL-MCNC: 14 MG/DL (ref 6–23)
CALCIUM SERPL-MCNC: 9.9 MG/DL (ref 8.6–10.6)
CHLORIDE SERPL-SCNC: 103 MMOL/L (ref 98–107)
CHOLEST SERPL-MCNC: 247 MG/DL (ref 0–199)
CHOLESTEROL/HDL RATIO: 3.7
CO2 SERPL-SCNC: 28 MMOL/L (ref 21–32)
CREAT SERPL-MCNC: 0.9 MG/DL (ref 0.5–1.05)
CREAT UR-MCNC: 96.1 MG/DL (ref 20–320)
EGFRCR SERPLBLD CKD-EPI 2021: 68 ML/MIN/1.73M*2
EOSINOPHIL # BLD AUTO: 0.08 X10*3/UL (ref 0–0.4)
EOSINOPHIL NFR BLD AUTO: 1.1 %
ERYTHROCYTE [DISTWIDTH] IN BLOOD BY AUTOMATED COUNT: 12.3 % (ref 11.5–14.5)
EST. AVERAGE GLUCOSE BLD GHB EST-MCNC: 117 MG/DL
GLUCOSE SERPL-MCNC: 128 MG/DL (ref 74–99)
HBA1C MFR BLD: 5.7 %
HCT VFR BLD AUTO: 46.3 % (ref 36–46)
HDLC SERPL-MCNC: 65.9 MG/DL
HGB BLD-MCNC: 15.1 G/DL (ref 12–16)
IMM GRANULOCYTES # BLD AUTO: 0.02 X10*3/UL (ref 0–0.5)
IMM GRANULOCYTES NFR BLD AUTO: 0.3 % (ref 0–0.9)
LDLC SERPL CALC-MCNC: 142 MG/DL
LDLC SERPL DIRECT ASSAY-MCNC: 155 MG/DL (ref 0–129)
LYMPHOCYTES # BLD AUTO: 1.16 X10*3/UL (ref 0.8–3)
LYMPHOCYTES NFR BLD AUTO: 15.3 %
MCH RBC QN AUTO: 31.2 PG (ref 26–34)
MCHC RBC AUTO-ENTMCNC: 32.6 G/DL (ref 32–36)
MCV RBC AUTO: 96 FL (ref 80–100)
MICROALBUMIN UR-MCNC: <7 MG/L
MICROALBUMIN/CREAT UR: NORMAL MG/G{CREAT}
MONOCYTES # BLD AUTO: 0.62 X10*3/UL (ref 0.05–0.8)
MONOCYTES NFR BLD AUTO: 8.2 %
NEUTROPHILS # BLD AUTO: 5.67 X10*3/UL (ref 1.6–5.5)
NEUTROPHILS NFR BLD AUTO: 74.6 %
NON HDL CHOLESTEROL: 181 MG/DL (ref 0–149)
NRBC BLD-RTO: 0 /100 WBCS (ref 0–0)
PLATELET # BLD AUTO: 294 X10*3/UL (ref 150–450)
POTASSIUM SERPL-SCNC: 4.5 MMOL/L (ref 3.5–5.3)
PROT SERPL-MCNC: 7.6 G/DL (ref 6.4–8.2)
RBC # BLD AUTO: 4.84 X10*6/UL (ref 4–5.2)
SODIUM SERPL-SCNC: 143 MMOL/L (ref 136–145)
T4 FREE SERPL-MCNC: 1.22 NG/DL (ref 0.78–1.48)
TRIGL SERPL-MCNC: 195 MG/DL (ref 0–149)
TSH SERPL-ACNC: 4.14 MIU/L (ref 0.44–3.98)
VIT B12 SERPL-MCNC: 671 PG/ML (ref 211–911)
VLDL: 39 MG/DL (ref 0–40)
WBC # BLD AUTO: 7.6 X10*3/UL (ref 4.4–11.3)

## 2024-01-31 PROCEDURE — 20610 DRAIN/INJ JOINT/BURSA W/O US: CPT | Performed by: ORTHOPAEDIC SURGERY

## 2024-01-31 RX ORDER — METHYLPREDNISOLONE ACETATE 40 MG/ML
40 INJECTION, SUSPENSION INTRA-ARTICULAR; INTRALESIONAL; INTRAMUSCULAR; SOFT TISSUE
Status: COMPLETED | OUTPATIENT
Start: 2024-01-31 | End: 2024-01-31

## 2024-01-31 RX ORDER — LIDOCAINE HYDROCHLORIDE 10 MG/ML
4 INJECTION INFILTRATION; PERINEURAL
Status: COMPLETED | OUTPATIENT
Start: 2024-01-31 | End: 2024-01-31

## 2024-01-31 RX ORDER — TRIAMCINOLONE ACETONIDE 40 MG/ML
2.5 INJECTION, SUSPENSION INTRA-ARTICULAR; INTRAMUSCULAR
Status: COMPLETED | OUTPATIENT
Start: 2024-01-31 | End: 2024-01-31

## 2024-01-31 RX ADMIN — TRIAMCINOLONE ACETONIDE 2.5 MG: 40 INJECTION, SUSPENSION INTRA-ARTICULAR; INTRAMUSCULAR at 08:34

## 2024-01-31 RX ADMIN — LIDOCAINE HYDROCHLORIDE 4 ML: 10 INJECTION INFILTRATION; PERINEURAL at 08:34

## 2024-01-31 RX ADMIN — METHYLPREDNISOLONE ACETATE 40 MG: 40 INJECTION, SUSPENSION INTRA-ARTICULAR; INTRALESIONAL; INTRAMUSCULAR; SOFT TISSUE at 08:34

## 2024-01-31 NOTE — PROGRESS NOTES
HPI  This is a pleasant 73 y.o. female here today for further evaluation of right  knee pain.  The pain started 6 mos ago.    It is worse with walking.  It is improved with rest and NSAIDs.  They do feel there has been swelling.   The pain is described as burning, tightness, and throbbing.  Pain is rated a 2.  They have not had any treatment.  They are here today for further evaluation.      Past Medical History:   Diagnosis Date    Abnormal results of thyroid function studies 11/08/2015    Thyroid function test abnormal    Breast lump 10/20/2015    Unspecified lump in breast    COVID-19 10/03/2022    COVID-19 virus infection    Encounter for immunization 02/22/2020    Need for 23-polyvalent pneumococcal polysaccharide vaccine    Encounter for immunization 02/09/2021    Need for prophylactic vaccination and inoculation against influenza    Encounter for screening for malignant neoplasm of colon 10/15/2014    Colon cancer screening    Essential (primary) hypertension 07/11/2016    Elevated BP    Mastodynia 05/07/2013    Breast pain    Nonrheumatic aortic (valve) stenosis 08/02/2017    Aortic valve stenosis, mild    Other chest pain 05/07/2013    Atypical chest pain    Other conditions influencing health status     History Of ___ Previous Pregnancies    Other enthesopathies, not elsewhere classified 01/19/2016    Tendinitis of toe    Other long term (current) drug therapy 11/13/2015    Medication dose changed    Other specified abnormal findings of blood chemistry 05/06/2021    Elevated serum creatinine    Other specified abnormal findings of blood chemistry 08/02/2017    TSH elevation    Personal history of colonic polyps 10/20/2015    History of adenomatous polyp of colon    Personal history of diseases of the skin and subcutaneous tissue     History of cyst of breast    Personal history of other diseases of the digestive system 02/10/2013    History of gastroenteritis    Personal history of other endocrine,  nutritional and metabolic disease 01/21/2019    History of goiter    Personal history of other endocrine, nutritional and metabolic disease 10/25/2014    History of hyperglycemia    Personal history of other specified conditions 10/20/2015    History of lymphadenopathy    Personal history of other specified conditions 02/10/2013    History of nausea    Personal history of other specified conditions 02/01/2013    History of diarrhea    Personal history of other specified conditions 01/29/2013    History of abdominal pain    Personal history of other specified conditions 01/29/2013    History of nausea and vomiting    Plantar fascial fibromatosis 08/06/2012    Plantar fasciitis    Polyp of colon 12/07/2016    Hyperplastic colon polyp    Unspecified abnormal findings in urine 12/07/2016    Abnormal urine findings       Past Surgical History:   Procedure Laterality Date    COLONOSCOPY  08/06/2012    Complete Colonoscopy       Social History     Socioeconomic History    Marital status:      Spouse name: Not on file    Number of children: Not on file    Years of education: Not on file    Highest education level: Not on file   Occupational History    Not on file   Tobacco Use    Smoking status: Never     Passive exposure: Never    Smokeless tobacco: Never   Substance and Sexual Activity    Alcohol use: Never    Drug use: Never    Sexual activity: Not on file   Other Topics Concern    Not on file   Social History Narrative    Not on file     Social Determinants of Health     Financial Resource Strain: Not on file   Food Insecurity: Not on file   Transportation Needs: Not on file   Physical Activity: Not on file   Stress: Not on file   Social Connections: Not on file   Intimate Partner Violence: Not on file   Housing Stability: Not on file           ROS  Reviewed and all pertinent positives mentioned in HPI.      EXAM  Patient is in no acute distress.  They are alert and oriented x3.  They are of normal mood and  affect.  They are in no acute distress.  The patient's limb is warm and well-perfused.  They have intact sensation to light touch in all lower extremity dermatomes.  There is a minimal effusion.    The patient's quadriceps and hamstring strength is 5 of 5.    The patient can do a straight leg raise with no radicular pain.  negative lachmans. negative posterior drawer.  There is 1+ patellofemoral crepitus.   The knee is stable to varus and valgus stress at both 0 and 30°.  There is tenderness along the medial joint line.  negative McMurrays      IMAGING  Imaging reviewed today reveal no gross fracture or dislocation.  Degenerative changes noted in the medial compartment and PF compartment.  MRI reviewed reveals No MRI for review      ASSESSMENT  left osteoarthritis      PLAN  Patient underwent an injection today for osteoarthritis flare versus possible degenerative meniscus tearing she tolerated it well.  Follow-up as needed.Patient ID: Savanah Hernandez is a 73 y.o. female.    L Inj/Asp: L knee on 1/31/2024 8:34 AM  Indications: pain  Details: 22 G needle, anterior approach  Medications: 40 mg methylPREDNISolone acetate 40 mg/mL; 4 mL lidocaine 10 mg/mL (1 %); 2.5 mg triamcinolone acetonide 40 mg/mL    Under sterile conditions the left knee was injected with 4cc of lidocaine 40mg DepoMedrol.  The patient tolerated the procedure well.  There were no apparent complications.  Sterile bandage was applied.  Procedure, treatment alternatives, risks and benefits explained, specific risks discussed. Consent was given by the patient. Immediately prior to procedure a time out was called to verify the correct patient, procedure, equipment, support staff and site/side marked as required. Patient was prepped and draped in the usual sterile fashion.             Tera Abebe MD

## 2024-02-12 NOTE — PROGRESS NOTES
Subjective   Reason for Visit: Savanah Hernandez is an 73 y.o. female here for a annual physical and follow up on htn and hyperglyceia and hypothyroidism        LAST VISIT PLAN 8/8/23      Benign essential HTN - Primary  stable.    Relevant Orders    CBC and Auto Differential    Comprehensive Metabolic Panel    Albumin , Urine Random    Elevated low density lipoprotein (LDL) cholesterol level  Pt prefers to continue to work on dietary treatment of her elevated cholesterol.    Relevant Orders    Lipid Panel    Cholesterol, LDL Direct    Fasting hyperglycemia    Relevant Orders    Hemoglobin A1C    Hypothyroidism due to Hashimoto's thyroiditis   tsh now euthyroid on present dose. Will follow.    Relevant Orders    TSH with reflex to Free T4 if abnormal    Vitamin B12    Stage 3a chronic kidney disease (CMS/HCC)    Relevant Orders    Vitamin D, Total    Pt instructions: Continue medications.  Watch salt, keep up with hydration and exercise.  Check blood pressure monthly: Goal BP at home is 120/70.  Heart Healthy/DASH diet info sent to your email.  Follow up in 6 months for annual and wellness.  Fasting blood test the week before that visit and urine test at the lab.  END INFO FROM PRIOR VISIT     Today's visit continued:  Past Medical, Surgical, and Family History reviewed and updated in chart.    Reviewed all medications by prescribing practitioner or clinical pharmacist (such as prescriptions, OTCs, herbal therapies and supplements) and documented in the medical record.  HPI  Health maintenance items last completed:  Colonoscopy: 7/2021- polyps-due 3 years so July 2024  Mammogram: 5/2023 - neg  Bone Density: 3/2022- osteopenia-ordered this and mammo  Urine albumin if HTN or DM2: 3/10/23 - <7.0 mg/L  Pap: 2/2020- neg-always normal  Pelvic: 2/2020=she would prefer not doing these as a screening exam anymore and i am ok with that.  Breast exam in office: annual today.  Vaccines due or not completed: flu, covid, tdap  (bad reaction to TD) declines viral vaccines. Did review them all with her.  Last Health Maintenance exam: 4/3/23  Cac score of zero in the past 2 years  Saw Dr jacobo, right knee steroid injection.   Xray mild oa, possible cppd, d/w her.  Feeling well. Has not had covid or any illness since last visit.  Mood good.  No cardio resp c/o.  Had labs jan 30-reviewed.  Scribe Transcription:  She denies any problems today.    She states she got a steroid injection in her right knee recently by a doctor here  which has helped greatly. She wasn’t given any exercises to do.     Scribe Attestation:  By signing my name below, I, Tera Edge, Medical Scribe  attest that this documentation has been prepared under the direction and in the presence of Ketty Puente MD.       Patient Care Team:  Ketty Puente MD as PCP - General     Review of Systems  All systems reviewed and are negative unless otherwise stated in HPI or noted in writing on the written   7 page history and review of systems document reviewed by me and  scanned in with this visit.  Negative.  Objective   Lab Results   Component Value Date    WBC 7.6 01/30/2024    HGB 15.1 01/30/2024    HCT 46.3 (H) 01/30/2024     01/30/2024    CHOL 247 (H) 01/30/2024    TRIG 195 (H) 01/30/2024    HDL 65.9 01/30/2024    LDLDIRECT 155 (H) 01/30/2024    ALT 14 01/30/2024    AST 17 01/30/2024     01/30/2024    K 4.5 01/30/2024     01/30/2024    CREATININE 0.90 01/30/2024    BUN 14 01/30/2024    CO2 28 01/30/2024    TSH 4.14 (H) 01/30/2024    HGBA1C 5.7 (H) 01/30/2024      1 HM Topic            Component  Ref Range & Units 2 wk ago  (1/30/24) 7 mo ago  (7/7/23) 11 mo ago  (3/10/23) 3 yr ago  (1/27/21) 4 yr ago  (12/20/19) 5 yr ago  (1/15/19) 5 yr ago  (4/17/18)   Thyroid Stimulating Hormone  0.44 - 3.98 mIU/L 4.14 High  3.06 CM 4.32 High  CM 3.08 CM 1.73 CM 2.30 CM 1.68 CM      Hemoglobin A1C         Component  Ref Range & Units 2 wk ago 7 mo ago 2 yr ago 6 yr  "ago   Hemoglobin A1C  see below % 5.7 High  5.5 R, CM 5.5 R, CM 5.2 R, CM   Estimated Average Glucose  Not Established mg/dL 117 111 R 111 R 103 R        Xray knee showed mild djd  and possibly cppd  D/w her re exercises.    Vitals:  /76 (BP Location: Right arm, Patient Position: Sitting, BP Cuff Size: Adult)   Pulse 64   Resp 18   Ht 1.6 m (5' 3\")   Wt 78.5 kg (173 lb)   LMP 04/03/2003 (Approximate)   BMI 30.65 kg/m²       Physical Exam  General: Patient is known to me, looks well, is in usual state of health, with  no obvious distress.  Head: normocephalic  Eyes: PERRL, EOMI, no scleral icterus or conjunctival abnormality  Ears:Normal canals and TM's  Oropharynx: Well hydrated mucosa. no lesions, erythema. palate moves well.  Neck: No masses, no cervical (A/P/ or Lateral) adenopathy. Thyroid not enlarged and no nodules. Carotid pulses normal and no bruits.  Chest: Normal AP diameter. No supraclavicular adenopathy.  Lungs: Clear to auscultation: no rales , wheezes, rhonchi, rubs, examined bilaterally, ant/post /lateral. RR normal.  Heart: RRR. No MGCR S1 S2 normal.  Abd: BS normal, no bruits. No hepatosplenomegaly. No abdominal mass or tenderness. No distension.  Extremities: No upper extremity edema. No lower leg edema.No ischemia.   Joints: no synovitis seen. No deformities.  Pulses: normal posterior tibialis pulses, normal dorsalis pedis pulses. normal radial pulses. Normal femoral pulses without bruits.  Neuro: CN 2-12 intact . Alert, oriented, appropriate.  No focal weakness observed. No tremors. Ambulation is normal .  Psych: Mood and affect normal. No cognitive deficits noted during this exam. Alert, oriented, appropriate.  Skin: No rash, petechiae or excessive bruising.   Lymph: no SC axillary or inguinal adenopathy  Breast Exam : Symmetric appearance. No masses, nipple discharge, skin retraction, axillary or supraclavicular adenopathy.     Savanah Patel was seen today for annual exam.  Diagnoses " and all orders for this visit:  Encounter for annual physical exam (Primary)  Need for hepatitis C screening test  -     Hepatitis C antibody; Future  Right knee pain, unspecified chronicity  Comments:  had steroid injection after eval by Dr Ngo. Note states left knee but pt states is right knee and had injection in right knee. Feb 2024.  Stage 3a chronic kidney disease (CMS/HCC)  Osteopenia of lumbar spine  -     XR DEXA bone density; Future  Benign essential HTN  -     lisinopril 20 mg tablet; Take 1 tablet (20 mg) by mouth once daily.  Elevated low density lipoprotein (LDL) cholesterol level  Hypothyroidism due to Hashimoto's thyroiditis  -     TSH with reflex to Free T4 if abnormal; Future  Visit for screening mammogram  -     BI mammo bilateral screening tomosynthesis; Future  Menopause  -     XR DEXA bone density; Future  Abnormal thyroid function test  -     TSH with reflex to Free T4 if abnormal; Future  Other orders  -     Full code    Discussed could increase levothyroxine to 75 mcg twice per week and 50 mcg other days. [place]otes she feels fine and is ok with just leaving it and rechecking tsh in future, she has occ missed a few pills when traveling.  Will mack in 2-4 months and she ishmael call re when needs refill on med, does not need now.    Inc chol is improving. She is statin intolerant and with zero cac score, repatha not warranted, continue zetia diet exercise.  Htn stable.  Ckd3a-labs are improved. Last elevated creatinine was a few years ago, will resolve this on her problem list.  History of systolic murmur: not heard today.    Refills done

## 2024-02-13 ENCOUNTER — OFFICE VISIT (OUTPATIENT)
Dept: PRIMARY CARE | Facility: CLINIC | Age: 74
End: 2024-02-13
Payer: COMMERCIAL

## 2024-02-13 VITALS
BODY MASS INDEX: 30.65 KG/M2 | WEIGHT: 173 LBS | DIASTOLIC BLOOD PRESSURE: 76 MMHG | HEART RATE: 64 BPM | HEIGHT: 63 IN | RESPIRATION RATE: 18 BRPM | SYSTOLIC BLOOD PRESSURE: 134 MMHG

## 2024-02-13 DIAGNOSIS — R94.6 ABNORMAL THYROID FUNCTION TEST: ICD-10-CM

## 2024-02-13 DIAGNOSIS — M25.561 RIGHT KNEE PAIN, UNSPECIFIED CHRONICITY: ICD-10-CM

## 2024-02-13 DIAGNOSIS — M85.88 OSTEOPENIA OF LUMBAR SPINE: ICD-10-CM

## 2024-02-13 DIAGNOSIS — Z78.0 MENOPAUSE: ICD-10-CM

## 2024-02-13 DIAGNOSIS — Z12.31 VISIT FOR SCREENING MAMMOGRAM: ICD-10-CM

## 2024-02-13 DIAGNOSIS — Z00.00 ENCOUNTER FOR ANNUAL PHYSICAL EXAM: Primary | ICD-10-CM

## 2024-02-13 DIAGNOSIS — N18.31 STAGE 3A CHRONIC KIDNEY DISEASE (MULTI): ICD-10-CM

## 2024-02-13 DIAGNOSIS — E06.3 HYPOTHYROIDISM DUE TO HASHIMOTO'S THYROIDITIS: ICD-10-CM

## 2024-02-13 DIAGNOSIS — E03.8 HYPOTHYROIDISM DUE TO HASHIMOTO'S THYROIDITIS: ICD-10-CM

## 2024-02-13 DIAGNOSIS — I10 BENIGN ESSENTIAL HTN: ICD-10-CM

## 2024-02-13 DIAGNOSIS — Z11.59 NEED FOR HEPATITIS C SCREENING TEST: ICD-10-CM

## 2024-02-13 DIAGNOSIS — E78.00 ELEVATED LOW DENSITY LIPOPROTEIN (LDL) CHOLESTEROL LEVEL: ICD-10-CM

## 2024-02-13 PROBLEM — Z91.199 NON-COMPLIANCE: Status: RESOLVED | Noted: 2023-02-27 | Resolved: 2024-02-13

## 2024-02-13 PROBLEM — R89.9 ABNORMAL LABORATORY TEST RESULT: Status: RESOLVED | Noted: 2023-04-16 | Resolved: 2024-02-13

## 2024-02-13 PROBLEM — M62.830 BACK MUSCLE SPASM: Status: RESOLVED | Noted: 2023-02-27 | Resolved: 2024-02-13

## 2024-02-13 PROCEDURE — 99397 PER PM REEVAL EST PAT 65+ YR: CPT | Performed by: INTERNAL MEDICINE

## 2024-02-13 PROCEDURE — 1125F AMNT PAIN NOTED PAIN PRSNT: CPT | Performed by: INTERNAL MEDICINE

## 2024-02-13 PROCEDURE — 3075F SYST BP GE 130 - 139MM HG: CPT | Performed by: INTERNAL MEDICINE

## 2024-02-13 PROCEDURE — 1159F MED LIST DOCD IN RCRD: CPT | Performed by: INTERNAL MEDICINE

## 2024-02-13 PROCEDURE — 1157F ADVNC CARE PLAN IN RCRD: CPT | Performed by: INTERNAL MEDICINE

## 2024-02-13 PROCEDURE — 1036F TOBACCO NON-USER: CPT | Performed by: INTERNAL MEDICINE

## 2024-02-13 PROCEDURE — 1160F RVW MEDS BY RX/DR IN RCRD: CPT | Performed by: INTERNAL MEDICINE

## 2024-02-13 PROCEDURE — 3078F DIAST BP <80 MM HG: CPT | Performed by: INTERNAL MEDICINE

## 2024-02-13 PROCEDURE — 1170F FXNL STATUS ASSESSED: CPT | Performed by: INTERNAL MEDICINE

## 2024-02-13 RX ORDER — LISINOPRIL 20 MG/1
20 TABLET ORAL DAILY
Qty: 90 TABLET | Refills: 3 | Status: SHIPPED | OUTPATIENT
Start: 2024-02-13

## 2024-02-13 SDOH — ECONOMIC STABILITY: FOOD INSECURITY: WITHIN THE PAST 12 MONTHS, YOU WORRIED THAT YOUR FOOD WOULD RUN OUT BEFORE YOU GOT MONEY TO BUY MORE.: NEVER TRUE

## 2024-02-13 SDOH — ECONOMIC STABILITY: INCOME INSECURITY: IN THE LAST 12 MONTHS, WAS THERE A TIME WHEN YOU WERE NOT ABLE TO PAY THE MORTGAGE OR RENT ON TIME?: NO

## 2024-02-13 SDOH — ECONOMIC STABILITY: HOUSING INSECURITY
IN THE LAST 12 MONTHS, WAS THERE A TIME WHEN YOU DID NOT HAVE A STEADY PLACE TO SLEEP OR SLEPT IN A SHELTER (INCLUDING NOW)?: NO

## 2024-02-13 SDOH — ECONOMIC STABILITY: HOUSING INSECURITY: IN THE LAST 12 MONTHS, HOW MANY PLACES HAVE YOU LIVED?: 1

## 2024-02-13 SDOH — ECONOMIC STABILITY: FOOD INSECURITY: WITHIN THE PAST 12 MONTHS, THE FOOD YOU BOUGHT JUST DIDN'T LAST AND YOU DIDN'T HAVE MONEY TO GET MORE.: NEVER TRUE

## 2024-02-13 SDOH — ECONOMIC STABILITY: TRANSPORTATION INSECURITY
IN THE PAST 12 MONTHS, HAS THE LACK OF TRANSPORTATION KEPT YOU FROM MEDICAL APPOINTMENTS OR FROM GETTING MEDICATIONS?: NO

## 2024-02-13 SDOH — ECONOMIC STABILITY: GENERAL
WHICH OF THE FOLLOWING DO YOU KNOW HOW TO USE AND HAVE ACCESS TO EVERY DAY? (CHOOSE ALL THAT APPLY): DESKTOP COMPUTER, LAPTOP COMPUTER, OR TABLET WITH BROADBAND INTERNET CONNECTION;SMARTPHONE WITH CELLULAR DATA PLAN

## 2024-02-13 SDOH — HEALTH STABILITY: PHYSICAL HEALTH: ON AVERAGE, HOW MANY DAYS PER WEEK DO YOU ENGAGE IN MODERATE TO STRENUOUS EXERCISE (LIKE A BRISK WALK)?: 6 DAYS

## 2024-02-13 SDOH — ECONOMIC STABILITY: TRANSPORTATION INSECURITY
IN THE PAST 12 MONTHS, HAS LACK OF TRANSPORTATION KEPT YOU FROM MEETINGS, WORK, OR FROM GETTING THINGS NEEDED FOR DAILY LIVING?: NO

## 2024-02-13 SDOH — HEALTH STABILITY: PHYSICAL HEALTH: ON AVERAGE, HOW MANY MINUTES DO YOU ENGAGE IN EXERCISE AT THIS LEVEL?: 30 MIN

## 2024-02-13 ASSESSMENT — PATIENT HEALTH QUESTIONNAIRE - PHQ9
1. LITTLE INTEREST OR PLEASURE IN DOING THINGS: NOT AT ALL
2. FEELING DOWN, DEPRESSED OR HOPELESS: NOT AT ALL
SUM OF ALL RESPONSES TO PHQ9 QUESTIONS 1 AND 2: 0
1. LITTLE INTEREST OR PLEASURE IN DOING THINGS: NOT AT ALL
2. FEELING DOWN, DEPRESSED OR HOPELESS: NOT AT ALL
SUM OF ALL RESPONSES TO PHQ9 QUESTIONS 1 AND 2: 0

## 2024-02-13 ASSESSMENT — ANXIETY QUESTIONNAIRES
1. FEELING NERVOUS, ANXIOUS, OR ON EDGE: NOT AT ALL
4. TROUBLE RELAXING: NOT AT ALL
2. NOT BEING ABLE TO STOP OR CONTROL WORRYING: NOT AT ALL
7. FEELING AFRAID AS IF SOMETHING AWFUL MIGHT HAPPEN: NOT AT ALL
5. BEING SO RESTLESS THAT IT IS HARD TO SIT STILL: NOT AT ALL
GAD7 TOTAL SCORE: 0
3. WORRYING TOO MUCH ABOUT DIFFERENT THINGS: NOT AT ALL
IF YOU CHECKED OFF ANY PROBLEMS ON THIS QUESTIONNAIRE, HOW DIFFICULT HAVE THESE PROBLEMS MADE IT FOR YOU TO DO YOUR WORK, TAKE CARE OF THINGS AT HOME, OR GET ALONG WITH OTHER PEOPLE: NOT DIFFICULT AT ALL
6. BECOMING EASILY ANNOYED OR IRRITABLE: NOT AT ALL

## 2024-02-13 ASSESSMENT — ACTIVITIES OF DAILY LIVING (ADL)
DRESSING: INDEPENDENT
HEARING - LEFT EAR: FUNCTIONAL
WALKS IN HOME: INDEPENDENT
TOILETING: INDEPENDENT
BATHING: INDEPENDENT
GROOMING: INDEPENDENT
ADEQUATE_TO_COMPLETE_ADL: YES
FEEDING YOURSELF: INDEPENDENT
GROCERY_SHOPPING: INDEPENDENT
PATIENT'S MEMORY ADEQUATE TO SAFELY COMPLETE DAILY ACTIVITIES?: YES
DRESSING YOURSELF: INDEPENDENT
MANAGING_FINANCES: INDEPENDENT
TAKING_MEDICATION: INDEPENDENT
JUDGMENT_ADEQUATE_SAFELY_COMPLETE_DAILY_ACTIVITIES: YES
DOING_HOUSEWORK: INDEPENDENT
BATHING: INDEPENDENT
HEARING - RIGHT EAR: FUNCTIONAL

## 2024-02-13 ASSESSMENT — SOCIAL DETERMINANTS OF HEALTH (SDOH)
HOW OFTEN DO YOU ATTEND CHURCH OR RELIGIOUS SERVICES?: NEVER
WITHIN THE LAST YEAR, HAVE YOU BEEN AFRAID OF YOUR PARTNER OR EX-PARTNER?: NO
IN A TYPICAL WEEK, HOW MANY TIMES DO YOU TALK ON THE PHONE WITH FAMILY, FRIENDS, OR NEIGHBORS?: MORE THAN THREE TIMES A WEEK
IN THE PAST 12 MONTHS, HAS THE ELECTRIC, GAS, OIL, OR WATER COMPANY THREATENED TO SHUT OFF SERVICE IN YOUR HOME?: NO
HOW HARD IS IT FOR YOU TO PAY FOR THE VERY BASICS LIKE FOOD, HOUSING, MEDICAL CARE, AND HEATING?: NOT HARD AT ALL
HOW OFTEN DO YOU GET TOGETHER WITH FRIENDS OR RELATIVES?: MORE THAN THREE TIMES A WEEK
WITHIN THE LAST YEAR, HAVE TO BEEN RAPED OR FORCED TO HAVE ANY KIND OF SEXUAL ACTIVITY BY YOUR PARTNER OR EX-PARTNER?: NO
HOW OFTEN DO YOU ATTENT MEETINGS OF THE CLUB OR ORGANIZATION YOU BELONG TO?: NEVER
WITHIN THE LAST YEAR, HAVE YOU BEEN KICKED, HIT, SLAPPED, OR OTHERWISE PHYSICALLY HURT BY YOUR PARTNER OR EX-PARTNER?: NO
WITHIN THE LAST YEAR, HAVE YOU BEEN HUMILIATED OR EMOTIONALLY ABUSED IN OTHER WAYS BY YOUR PARTNER OR EX-PARTNER?: NO
DO YOU BELONG TO ANY CLUBS OR ORGANIZATIONS SUCH AS CHURCH GROUPS UNIONS, FRATERNAL OR ATHLETIC GROUPS, OR SCHOOL GROUPS?: NO

## 2024-02-13 ASSESSMENT — LIFESTYLE VARIABLES
HOW MANY STANDARD DRINKS CONTAINING ALCOHOL DO YOU HAVE ON A TYPICAL DAY: PATIENT DOES NOT DRINK
SKIP TO QUESTIONS 9-10: 1
AUDIT-C TOTAL SCORE: 0
HOW OFTEN DO YOU HAVE A DRINK CONTAINING ALCOHOL: NEVER
HOW OFTEN DO YOU HAVE SIX OR MORE DRINKS ON ONE OCCASION: NEVER

## 2024-02-13 ASSESSMENT — COLUMBIA-SUICIDE SEVERITY RATING SCALE - C-SSRS
2. HAVE YOU ACTUALLY HAD ANY THOUGHTS OF KILLING YOURSELF?: NO
1. IN THE PAST MONTH, HAVE YOU WISHED YOU WERE DEAD OR WISHED YOU COULD GO TO SLEEP AND NOT WAKE UP?: NO
6. HAVE YOU EVER DONE ANYTHING, STARTED TO DO ANYTHING, OR PREPARED TO DO ANYTHING TO END YOUR LIFE?: NO

## 2024-02-13 ASSESSMENT — PAIN SCALES - GENERAL: PAINLEVEL: 2

## 2024-02-13 NOTE — PATIENT INSTRUCTIONS
Keep same thyroid dose. Take on an empty stomach by itself and nothing other than water for 60 minutes after.  Repeat blood test in 2-4 months.  No vitamins for 24 hours prior to the blood test.    Monitor bp at home  Goal is 120/70    Follow up on blood pressure 6 months,  Annual in one year: fasting labs before the annual.    Mammogram and bone density in may.  Contact office if questions about results or you have not heard about results 2 weeks after the testing is completed.

## 2024-04-30 ENCOUNTER — LAB (OUTPATIENT)
Dept: LAB | Facility: LAB | Age: 74
End: 2024-04-30
Payer: COMMERCIAL

## 2024-04-30 DIAGNOSIS — E03.8 HYPOTHYROIDISM DUE TO HASHIMOTO'S THYROIDITIS: ICD-10-CM

## 2024-04-30 DIAGNOSIS — E06.3 HYPOTHYROIDISM DUE TO HASHIMOTO'S THYROIDITIS: ICD-10-CM

## 2024-04-30 DIAGNOSIS — R94.6 ABNORMAL THYROID FUNCTION TEST: ICD-10-CM

## 2024-04-30 DIAGNOSIS — Z11.59 NEED FOR HEPATITIS C SCREENING TEST: ICD-10-CM

## 2024-04-30 PROCEDURE — 84443 ASSAY THYROID STIM HORMONE: CPT

## 2024-04-30 PROCEDURE — 36415 COLL VENOUS BLD VENIPUNCTURE: CPT

## 2024-04-30 PROCEDURE — 86803 HEPATITIS C AB TEST: CPT

## 2024-05-01 LAB
HCV AB SER QL: NONREACTIVE
TSH SERPL-ACNC: 3.56 MIU/L (ref 0.44–3.98)

## 2024-05-22 ENCOUNTER — HOSPITAL ENCOUNTER (OUTPATIENT)
Dept: RADIOLOGY | Facility: CLINIC | Age: 74
Discharge: HOME | End: 2024-05-22
Payer: COMMERCIAL

## 2024-05-22 VITALS — HEIGHT: 63 IN | WEIGHT: 173 LBS | BODY MASS INDEX: 30.65 KG/M2

## 2024-05-22 DIAGNOSIS — Z78.0 MENOPAUSE: ICD-10-CM

## 2024-05-22 DIAGNOSIS — M85.88 OSTEOPENIA OF LUMBAR SPINE: ICD-10-CM

## 2024-05-22 DIAGNOSIS — Z12.31 VISIT FOR SCREENING MAMMOGRAM: ICD-10-CM

## 2024-05-22 PROCEDURE — 77067 SCR MAMMO BI INCL CAD: CPT | Performed by: STUDENT IN AN ORGANIZED HEALTH CARE EDUCATION/TRAINING PROGRAM

## 2024-05-22 PROCEDURE — 77080 DXA BONE DENSITY AXIAL: CPT | Performed by: STUDENT IN AN ORGANIZED HEALTH CARE EDUCATION/TRAINING PROGRAM

## 2024-05-22 PROCEDURE — 77067 SCR MAMMO BI INCL CAD: CPT

## 2024-05-22 PROCEDURE — 77080 DXA BONE DENSITY AXIAL: CPT

## 2024-05-22 PROCEDURE — 77063 BREAST TOMOSYNTHESIS BI: CPT | Performed by: STUDENT IN AN ORGANIZED HEALTH CARE EDUCATION/TRAINING PROGRAM

## 2024-05-23 NOTE — RESULT ENCOUNTER NOTE
Please contact the patient regarding her result.  TThe bone density showed some bone thinning,called osteopenia, but not osteoporosis.   Recommend:   1.Keep up with calcium intake (diet and or supplement, 1200 mg daily total).   2.Keep up with Vitamin D (either dose we have discussed or 800-1000iu daily).  3.Weight bearing exercise. The National Osteoporosis Website has instructions for exercise to strengthen bones.  4.Bone DEXA should be repeated in 2 years.  We can review further at the next appt. if any questions.

## 2024-08-07 NOTE — PROGRESS NOTES
Patient ID: Savanah Hernandez is a 73 y.o. female who presents for Follow-up (Pt here for follow up and review. Pt denies any new problems or concerns at this time. )  LAST VISIT PLAN FROM: 02/13/2024  Discussed could increase levothyroxine to 75 mcg twice per week and 50 mcg other days. Shenotes she feels fine and is ok with just leaving it and rechecking tsh in future, she has occ missed a few pills when traveling.  Will mack in 2-4 months and she ishmael call re when needs refill on med, does not need now.  Inc chol is improving. She is statin intolerant and with zero cac score, repatha not warranted, continue zetia diet exercise.  Htn stable.  Ckd3a-labs are improved. Last elevated creatinine was a few years ago, will resolve this on her problem list.  History of systolic murmur: not heard today.  Refills done  END LAST VISIT PLAN  -------------------------------------------  HPI  Patient is a 73-year-old female who presents today for follow up of BP. And review of bone density    Since last visit, patient underwent a mammogram on 05/22/2024 which was normal and DEXA scan on 05/22/2024 which showed osteopenia. (See Objective).    Hypertension:  Follow up on cardiovascular conditions:  Cardiac:   Chest pain?No  Palpitations? No  Increased gates?  No  Other:  Resp:   Shortness of breath?No  Wheezing No  Cough No  Other:  Extremities:   Leg or ankle swelling?No   Claudication?No  Other:  Neuro:  DizzinessNo  SyncopeNo   Blurred visionNo  New headaches No  Other:  Medications:   Current Outpatient Medications   Medication Instructions    ascorbic acid (VITAMIN C) 1,000 mg, oral, Daily    BACILLUS COAGULANS-INULIN ORAL 1 tablet, oral, Daily RT    calcium-magnesium 300-300 mg tablet 1 tablet, oral, Daily    cholecalciferol (vitamin D3) 5,000 Units, oral, Daily, Taking Vitamin D3 and M-K-7    cinnamon (CINNAMON) 500 mg, oral, 2 times daily    ELDERBERRY FRUIT ORAL 1 tablet, oral, Daily    estradiol (ESTRACE) 2 g, vaginal,  Daily    ezetimibe (ZETIA) 10 mg, oral, Every other day    britta root (britta, Zingiber officinalis,) 550 mg capsule 1 tablet, oral, 2 times daily    levothyroxine (SYNTHROID, LEVOXYL) 50 mcg, oral, Daily    lisinopril 30 mg, oral, Daily    metroNIDAZOLE (Metrogel) 0.75 % gel 1 Application, Topical, 2 times daily    multivitamin tablet 1 tablet, oral, Daily    niacin 500 mg, oral, Daily with breakfast    om3-dha-epa-cod liver-vit A-D3 240-1,000 mg capsule oral    TURMERIC ORAL 3 tablets, oral, Daily    ZINC CITRATE ORAL 2 tablets, oral, Daily      Taking them regularly.Yes  Side effects.No    She brought her at-home readings today which show 139/80, 136/78, 148/90, and 138/95.  We discussed increasing her lisinopril to 30 mg. She denies missing any medication.  She states her salt intake has not increased but does use a salt shaker.  Recommended not using a salt shaker.  She states she has been eating pretzels lately.  Increased her lisinopril to 30 mg daily.  Advised to monitor BP at home, advised to call if her BP drops too low (105 systolic), especially if she reduces her salt intake. Goal is 120/70.  Her at-home monitor correlates with our office monitor, so at-home monitoring is acceptable.    Prescription renewed for Synthroid.     Fasting blood work ordered to be completed prior to next visit.    Follow up in January /February.     Review of Systems  See ROS in HPI    Current Outpatient Medications   Medication Instructions    ascorbic acid (VITAMIN C) 1,000 mg, oral, Daily    BACILLUS COAGULANS-INULIN ORAL 1 tablet, oral, Daily RT    calcium-magnesium 300-300 mg tablet 1 tablet, oral, Daily    cholecalciferol (vitamin D3) 5,000 Units, oral, Daily, Taking Vitamin D3 and M-K-7    cinnamon (CINNAMON) 500 mg, oral, 2 times daily    ELDERBERRY FRUIT ORAL 1 tablet, oral, Daily    estradiol (ESTRACE) 2 g, vaginal, Daily    ezetimibe (ZETIA) 10 mg, oral, Every other day    britta root (britta, Zingiber  officinalis,) 550 mg capsule 1 tablet, oral, 2 times daily    levothyroxine (SYNTHROID, LEVOXYL) 50 mcg, oral, Daily    lisinopril 30 mg, oral, Daily    metroNIDAZOLE (Metrogel) 0.75 % gel 1 Application, Topical, 2 times daily    multivitamin tablet 1 tablet, oral, Daily    niacin 500 mg, oral, Daily with breakfast    om3-dha-epa-cod liver-vit A-D3 240-1,000 mg capsule oral    TURMERIC ORAL 3 tablets, oral, Daily    ZINC CITRATE ORAL 2 tablets, oral, Daily     Allergies   Allergen Reactions    Tetanus And Diphtheria Toxoids, Adsorbed, Adult Dizziness    Latex Rash     Objective    Results  reviewed:   Mammogram 05/22/2024:  IMPRESSION:  No mammographic evidence of malignancy.  BI-RADS CATEGORY:  BI-RADS Category:  1 Negative.  Recommendation:  Annual Screening.  Recommended Date:  1 Year.  Laterality:  Bilateral.  For any future breast imaging appointments, please call [phone]).    DEXA Scan 05/22/2024:  FINDINGS:  SPINE L1-L4  Bone Mineral Density: 1.058  T-Score -1.1  Z-Score 0.6  Classification:  Not reported  Bone Mineral Density change vs baseline:  Not reported  Bone Mineral Density change vs previous: Not reported  LEFT FEMUR -TOTAL  Bone Mineral Density: 0.769  T-Score -1.9   Z-Score  -0.3  Classification:  Not reported  Bone Mineral Density change vs baseline: Not reported  Bone Mineral Density change vs previous: Not reported  LEFT FEMUR -NECK  Bone Mineral Density: 0.763  T-Score -2.0  Z-Score -0.2  Classification:  Not reported  World Health Organization (WHO) criteria for post-menopausal,   Women:  Normal:         T-score at or above -1 SD  Osteopenia:   T-score between -1 and -2.5 SD  Osteoporosis: T-score at or below -2.5 SD  10-year Fracture Risk:  Major Osteoporotic Fracture  19.7  Hip Fracture                        8.0  Note:  If no FRAX score is reported, it is because:  Some T-score for Spine Total or Hip Total or Femoral Neck at or below  -2.5  This exam was performed at   "[facility] on a [facility] Dexa Unit.  IMPRESSION:  DEXA:  According to World Health Organization criteria,  classification is low bone mass (osteopenia)  Followup recommended in two years or sooner as clinically warranted.  All images and detailed analysis are available on the  Radiology  PACS.  MACRO:  None  Signed by: Monalisa Gross 5/22/2024 4:38 PM  Dictation workstation:   [id number]  Latest Complete Lab Results:    Chemistry    Lab Results   Component Value Date/Time     01/30/2024 1154    K 4.5 01/30/2024 1154     01/30/2024 1154    CO2 28 01/30/2024 1154    BUN 14 01/30/2024 1154    CREATININE 0.90 01/30/2024 1154    Lab Results   Component Value Date/Time    CALCIUM 9.9 01/30/2024 1154    ALKPHOS 54 01/30/2024 1154    AST 17 01/30/2024 1154    ALT 14 01/30/2024 1154    BILITOT 0.6 01/30/2024 1154           Lab Results   Component Value Date    WBC 7.6 01/30/2024    HGB 15.1 01/30/2024    HCT 46.3 (H) 01/30/2024    MCV 96 01/30/2024     01/30/2024      Lab Results   Component Value Date    HGBA1C 5.7 (H) 01/30/2024      Lab Results   Component Value Date    LDLCALC 142 (H) 01/30/2024      No results found for: \"ALBUR\", \"[id number]\"   Lab Results   Component Value Date    RTAWUTVE12 671 01/30/2024      Lab Results   Component Value Date    TSH 3.56 04/30/2024      No results found for: \"PSA\"   Lab Results   Component Value Date    CHOL 247 (H) 01/30/2024    CHOL 242 (H) 07/07/2023    CHOL 267 (H) 03/10/2023     Lab Results   Component Value Date    HDL 65.9 01/30/2024    HDL 61.7 07/07/2023    HDL 66.2 03/10/2023     Lab Results   Component Value Date    LDLCALC 142 (H) 01/30/2024     Lab Results   Component Value Date    TRIG 195 (H) 01/30/2024    TRIG 108 07/07/2023    TRIG 109 03/10/2023     Physical Exam  Vitals:      8/8/2023     1:49 PM 11/9/2023     1:23 PM 1/30/2024    10:52 AM 2/13/2024     1:41 PM 5/22/2024    12:44 PM 8/12/2024     3:01 PM 8/12/2024     3:53 " "PM   Vitals   Systolic 138 138  134  134 130   Diastolic 78 77  76  88 88   Heart Rate 66 81  64  60    Temp  36.8 °C (98.2 °F)        Resp 18   18  18    Height (in) 1.6 m (5' 3\") 1.6 m (5' 3\") 1.6 m (5' 3\") 1.6 m (5' 3\") 1.6 m (5' 3\") 1.626 m (5' 4\")    Weight (lb) 177 175 170 173 173 177    BMI 31.35 kg/m2 31 kg/m2 30.11 kg/m2 30.65 kg/m2 30.65 kg/m2 30.38 kg/m2    BSA (m2) 1.89 m2 1.88 m2 1.85 m2 1.87 m2 1.87 m2 1.9 m2    Visit Report Report Report Report Report  Report Report     Patient looks well and is in no obvious distress.  HEENT:   Normocephalic, no facial asymmetry  Eyes: Sclera and conjunctiva are clear.  Neck: No adenopathy cervical (Ant/post/lat), no Supraclavicular nodes.   Thyroid normal.  Carotid pulses normal, no carotid bruits.  Lungs : RR normal. Clear to auscultation anterior, posterior and lateral. No rales, wheezes rhonchi or rubs. Good air exchange.  Heart: RRR. No Murmur, gallop, click or rub.  Abdomen: Bowel sounds normal, No bruits. No pulsatile mass. No hepatosplenomegaly, masses or tenderness. Soft, no guarding.  Vascular:  Posterior tibialis and dorsalis pedis pulses within normal limits bilaterally.   Extremities: no upper extremity edema. No lower extremity edema.   Musculoskeletal: no synovitis of joints seen. No new deformity.  Neuro: CN 2-12 intact. Alert, appropriate.  Ambulates independently.  No gross motor deficit.   No tremors.  Psych: normal mood and affect.  Skin: No rash, bruising petechiae or jaundice.    Savanah Patel was seen today for follow-up.  Diagnoses and all orders for this visit:  Benign essential HTN (Primary)  -     lisinopril 30 mg tablet; Take 1 tablet (30 mg) by mouth once daily.  Hyperlipidemia, unspecified hyperlipidemia type  Hypothyroidism due to Hashimoto's thyroiditis  -     levothyroxine (Synthroid, Levoxyl) 50 mcg tablet; Take 1 tablet (50 mcg) by mouth once daily.  Osteopenia of lumbar spine  Medication dose increased     Problem List Items Addressed " This Visit       Benign essential HTN - Primary    Relevant Medications    lisinopril 30 mg tablet    Hyperlipidemia    Hypothyroidism due to Hashimoto's thyroiditis    Relevant Medications    levothyroxine (Synthroid, Levoxyl) 50 mcg tablet    Osteopenia of lumbar spine     Other Visit Diagnoses       Medication dose increased            Osteopenia: keep up with calcium vit d and wt bearing exercise  See patient instructions in wrap up plan, orders and comments for treatment plan.    I am the Internal Medicine physician providing ongoing chronic medical care for this patient, which is managed during and in between office visits.    Patient Instructions:  Increase lisinopril to 30 mg once per day. We sent 30 mg tabs to the pharmacy: Take one per day.    Monitor bp at home, call if problems. Goal is 120/70 or close to it.  Since your blood pressure machine is accurate, home monitoring is acceptable.  Watch salt.  If you make a dramatic reduction in salt intake, and blood pressure drops below 105 systolic (top number) consistently, contact the office to discuss.      Follow up January/February: Fasting blood test in the few weeks prior to that visit.  Orders will be in for blood work.  Instructions for obtaining lab tests:   You do not need a paper requisition when obtaining tests at a  facility. No appointment is needed for lab tests.  For fasting blood tests:  Please do not consume calories for 10-12 hours prior to this blood test. It is ok to drink water, you should be hydrated.  Please do not take vitamins, supplements or thyroid medication before your blood is drawn this day.   Most lab results should be available for your review on the  My Chart portal within 48 hours. Please contact the office if you have not received results within 2 weeks of obtaining the test.  Annual physical/wellness visit at that time.  ----------------------  Scribe Attestation  By signing my name below, Naty CHAVEZ Scribe    attest that this documentation has been prepared under the direction and in the presence of Ketty Puente MD.

## 2024-08-12 ENCOUNTER — APPOINTMENT (OUTPATIENT)
Dept: PRIMARY CARE | Facility: CLINIC | Age: 74
End: 2024-08-12
Payer: COMMERCIAL

## 2024-08-12 VITALS
WEIGHT: 177 LBS | RESPIRATION RATE: 18 BRPM | BODY MASS INDEX: 30.22 KG/M2 | HEIGHT: 64 IN | DIASTOLIC BLOOD PRESSURE: 88 MMHG | HEART RATE: 60 BPM | SYSTOLIC BLOOD PRESSURE: 130 MMHG

## 2024-08-12 DIAGNOSIS — E78.5 HYPERLIPIDEMIA, UNSPECIFIED HYPERLIPIDEMIA TYPE: ICD-10-CM

## 2024-08-12 DIAGNOSIS — Z79.899 MEDICATION DOSE INCREASED: ICD-10-CM

## 2024-08-12 DIAGNOSIS — E03.8 HYPOTHYROIDISM DUE TO HASHIMOTO'S THYROIDITIS: ICD-10-CM

## 2024-08-12 DIAGNOSIS — I10 BENIGN ESSENTIAL HTN: Primary | ICD-10-CM

## 2024-08-12 DIAGNOSIS — M85.88 OSTEOPENIA OF LUMBAR SPINE: ICD-10-CM

## 2024-08-12 DIAGNOSIS — E06.3 HYPOTHYROIDISM DUE TO HASHIMOTO'S THYROIDITIS: ICD-10-CM

## 2024-08-12 PROCEDURE — 99213 OFFICE O/P EST LOW 20 MIN: CPT | Performed by: INTERNAL MEDICINE

## 2024-08-12 PROCEDURE — 1036F TOBACCO NON-USER: CPT | Performed by: INTERNAL MEDICINE

## 2024-08-12 PROCEDURE — 1126F AMNT PAIN NOTED NONE PRSNT: CPT | Performed by: INTERNAL MEDICINE

## 2024-08-12 PROCEDURE — 3008F BODY MASS INDEX DOCD: CPT | Performed by: INTERNAL MEDICINE

## 2024-08-12 PROCEDURE — 1160F RVW MEDS BY RX/DR IN RCRD: CPT | Performed by: INTERNAL MEDICINE

## 2024-08-12 PROCEDURE — 3079F DIAST BP 80-89 MM HG: CPT | Performed by: INTERNAL MEDICINE

## 2024-08-12 PROCEDURE — 1159F MED LIST DOCD IN RCRD: CPT | Performed by: INTERNAL MEDICINE

## 2024-08-12 PROCEDURE — 3075F SYST BP GE 130 - 139MM HG: CPT | Performed by: INTERNAL MEDICINE

## 2024-08-12 PROCEDURE — 1157F ADVNC CARE PLAN IN RCRD: CPT | Performed by: INTERNAL MEDICINE

## 2024-08-12 PROCEDURE — G2211 COMPLEX E/M VISIT ADD ON: HCPCS | Performed by: INTERNAL MEDICINE

## 2024-08-12 RX ORDER — LISINOPRIL 30 MG/1
30 TABLET ORAL DAILY
Qty: 90 TABLET | Refills: 3 | Status: SHIPPED | OUTPATIENT
Start: 2024-08-12

## 2024-08-12 RX ORDER — PHENYLEPHRINE HCL 10 MG
500 TABLET ORAL 2 TIMES DAILY
COMMUNITY

## 2024-08-12 RX ORDER — IBUPROFEN 100 MG/5ML
1000 SUSPENSION, ORAL (FINAL DOSE FORM) ORAL DAILY
COMMUNITY

## 2024-08-12 RX ORDER — LEVOTHYROXINE SODIUM 50 UG/1
50 TABLET ORAL DAILY
Qty: 90 TABLET | Refills: 3 | Status: SHIPPED | OUTPATIENT
Start: 2024-08-12

## 2024-08-12 RX ORDER — GINGER ROOT 550 MG
1 CAPSULE ORAL 2 TIMES DAILY
COMMUNITY

## 2024-08-12 ASSESSMENT — PAIN SCALES - GENERAL: PAINLEVEL: 0-NO PAIN

## 2024-08-12 NOTE — PATIENT INSTRUCTIONS
Increase lisinopril to 30 mg once per day. We sent 30 mg tabs to the pharmacy: Take one per day.    Monitor bp at home, call if problems. Goal is 120/70 or close to it.  Since your blood pressure machine is accurate, home monitoring is acceptable.  Watch salt.  If you make a dramatic reduction in salt intake, and blood pressure drops below 105 systolic (top number) consistently, contact the office to discuss.      Follow up January/February: Fasting blood test in the few weeks prior to that visit.  Orders will be in for blood work.  Instructions for obtaining lab tests:   You do not need a paper requisition when obtaining tests at a  facility. No appointment is needed for lab tests.  For fasting blood tests:  Please do not consume calories for 10-12 hours prior to this blood test. It is ok to drink water, you should be hydrated.  Please do not take vitamins, supplements or thyroid medication before your blood is drawn this day.   Most lab results should be available for your review on the  My Chart portal within 48 hours. Please contact the office if you have not received results within 2 weeks of obtaining the test.      Annual physical/wellness visit at that time.

## 2024-08-14 ENCOUNTER — APPOINTMENT (OUTPATIENT)
Dept: PRIMARY CARE | Facility: CLINIC | Age: 74
End: 2024-08-14
Payer: COMMERCIAL

## 2024-08-16 DIAGNOSIS — I10 BENIGN ESSENTIAL HTN: ICD-10-CM

## 2024-08-16 DIAGNOSIS — E06.3 HYPOTHYROIDISM DUE TO HASHIMOTO'S THYROIDITIS: Primary | ICD-10-CM

## 2024-08-16 DIAGNOSIS — E78.2 MIXED HYPERLIPIDEMIA: ICD-10-CM

## 2024-08-16 DIAGNOSIS — M85.88 OSTEOPENIA OF LUMBAR SPINE: ICD-10-CM

## 2024-08-16 DIAGNOSIS — E03.8 HYPOTHYROIDISM DUE TO HASHIMOTO'S THYROIDITIS: Primary | ICD-10-CM

## 2024-08-16 DIAGNOSIS — R73.01 FASTING HYPERGLYCEMIA: ICD-10-CM

## 2024-08-16 PROBLEM — R01.1 SYSTOLIC MURMUR: Status: RESOLVED | Noted: 2023-02-27 | Resolved: 2024-08-16

## 2024-08-16 PROBLEM — H69.91 DYSFUNCTION OF RIGHT EUSTACHIAN TUBE: Status: RESOLVED | Noted: 2023-02-27 | Resolved: 2024-08-16

## 2024-08-16 PROBLEM — K63.5 COLON POLYPS: Status: RESOLVED | Noted: 2023-02-27 | Resolved: 2024-08-16

## 2024-11-14 ENCOUNTER — APPOINTMENT (OUTPATIENT)
Dept: UROLOGY | Facility: CLINIC | Age: 74
End: 2024-11-14
Payer: COMMERCIAL

## 2024-11-14 VITALS
HEIGHT: 63 IN | BODY MASS INDEX: 30.12 KG/M2 | HEART RATE: 94 BPM | DIASTOLIC BLOOD PRESSURE: 85 MMHG | SYSTOLIC BLOOD PRESSURE: 148 MMHG | TEMPERATURE: 98 F | WEIGHT: 170 LBS

## 2024-11-14 DIAGNOSIS — N95.8 GENITOURINARY SYNDROME OF MENOPAUSE: ICD-10-CM

## 2024-11-14 DIAGNOSIS — N81.4 CYSTOCELE WITH PROLAPSE: Primary | ICD-10-CM

## 2024-11-14 DIAGNOSIS — N81.6 RECTOCELE: ICD-10-CM

## 2024-11-14 LAB
POC APPEARANCE, URINE: CLEAR
POC BILIRUBIN, URINE: NEGATIVE
POC BLOOD, URINE: NEGATIVE
POC COLOR, URINE: YELLOW
POC GLUCOSE, URINE: NEGATIVE MG/DL
POC KETONES, URINE: ABNORMAL MG/DL
POC LEUKOCYTES, URINE: NEGATIVE
POC NITRITE,URINE: NEGATIVE
POC PH, URINE: 6 PH
POC PROTEIN, URINE: NEGATIVE MG/DL
POC SPECIFIC GRAVITY, URINE: 1.02
POC UROBILINOGEN, URINE: 0.2 EU/DL

## 2024-11-14 PROCEDURE — G2211 COMPLEX E/M VISIT ADD ON: HCPCS | Performed by: NURSE PRACTITIONER

## 2024-11-14 PROCEDURE — 99213 OFFICE O/P EST LOW 20 MIN: CPT | Performed by: NURSE PRACTITIONER

## 2024-11-14 PROCEDURE — 3077F SYST BP >= 140 MM HG: CPT | Performed by: NURSE PRACTITIONER

## 2024-11-14 PROCEDURE — 3079F DIAST BP 80-89 MM HG: CPT | Performed by: NURSE PRACTITIONER

## 2024-11-14 PROCEDURE — 1159F MED LIST DOCD IN RCRD: CPT | Performed by: NURSE PRACTITIONER

## 2024-11-14 PROCEDURE — 51798 US URINE CAPACITY MEASURE: CPT | Performed by: NURSE PRACTITIONER

## 2024-11-14 PROCEDURE — 1036F TOBACCO NON-USER: CPT | Performed by: NURSE PRACTITIONER

## 2024-11-14 PROCEDURE — 1157F ADVNC CARE PLAN IN RCRD: CPT | Performed by: NURSE PRACTITIONER

## 2024-11-14 PROCEDURE — 81003 URINALYSIS AUTO W/O SCOPE: CPT | Performed by: NURSE PRACTITIONER

## 2024-11-14 PROCEDURE — 3008F BODY MASS INDEX DOCD: CPT | Performed by: NURSE PRACTITIONER

## 2024-11-14 RX ORDER — ESTRADIOL 0.1 MG/G
CREAM VAGINAL
Qty: 42.5 G | Refills: 5 | Status: SHIPPED | OUTPATIENT
Start: 2024-11-14

## 2024-11-14 ASSESSMENT — PATIENT HEALTH QUESTIONNAIRE - PHQ9
2. FEELING DOWN, DEPRESSED OR HOPELESS: NOT AT ALL
1. LITTLE INTEREST OR PLEASURE IN DOING THINGS: NOT AT ALL
SUM OF ALL RESPONSES TO PHQ9 QUESTIONS 1 AND 2: 0

## 2024-11-14 NOTE — PROGRESS NOTES
"11/14/24   [id number]    Pelvic exam, prolapse monitoring     Subjective      HPI Savanah Hernandez is a 74 y.o. female who presents for pelvic exam, monitoring; stage II cystocele noted past couple years; happy with monitoring prolapse, not bothering her;     no urinary symptoms, no incontinence, urgency or frequency; no UTIs, no hematuria, emptying bladder well, although she can feel prolapse at times, it is not outside introitus and she would like to continue to monitor for now; continues as stage II cystocele on exam today;     1/30/24 creatinine 0.90, GFR 68    PVR 2 ml, UA negative, no heme, no leuk, no nitrite;    Dr. Puente is her primary care      [facility] patient    History reviewed   PMH: HTN, cholesterol, thyroid  PSH: no surgeries  FH: maternal grandmother breast cancer (older)  SH: retired [employer], never smoked           Objective     /85   Pulse 94   Temp 36.7 °C (98 °F)   Ht 1.6 m (5' 3\")   Wt 77.1 kg (170 lb)   LMP 04/03/2003 (Approximate)   BMI 30.11 kg/m²    Physical Exam  Genitourinary:     Comments: Stage II large cystocele, moderate rectocele, moderate atrophy  Neg CST lying down; no notable increase since prior;       General: Appears comfortable and in no apparent distress, well nourished  Head: Normocephalic, atraumatic  Neck: trachea midline  Respiratory: respirations unlabored, no wheezes, and no use of accessory muscles  Cardiovascular: at rest no dyspnea, well perfused  Skin: no visible rashes or lesions  Neurologic: grossly intact, oriented to person, place, and time  Psychiatric: mood and affect appropriate  Musculoskeletal: in chair for appt. no difficulty w upper body movement        Assessment/Plan   Problem List Items Addressed This Visit    None  Visit Diagnoses       Cystocele with prolapse    -  Primary    Relevant Orders    POCT UA Automated manually resulted (Completed)    Post-Void Residual (Completed)    Genitourinary syndrome of menopause        " Relevant Medications    estradiol (Estrace) 0.01 % (0.1 mg/gram) vaginal cream    Rectocele                Orders Placed This Encounter   Procedures    Post-Void Residual    POCT UA Automated manually resulted     Order Specific Question:   Release result to [employer]     Answer:   Immediate [1]      Discussed monitoring, pessary and surgical correction for prolapse  Emptying bladder, no UTIs, no OAB symptoms  Patient would like to continue to monitor for now  Using vaginal estrogen cream pea size amount couple x per week, refill sent    Follow up 2 year pelvic exam, PVR UA, sooner if any concerns;        Danita Gaston, APRN-CNP  Lab Results   Component Value Date    GLUCOSE 128 (H) 01/30/2024    CALCIUM 9.9 01/30/2024     01/30/2024    K 4.5 01/30/2024    CO2 28 01/30/2024     01/30/2024    BUN 14 01/30/2024    CREATININE 0.90 01/30/2024

## 2024-11-14 NOTE — PATIENT INSTRUCTIONS
2 years follow up prolapse, has stayed about the same  No concerns, emptying bladder  Nurse line [phone]

## 2025-01-24 ENCOUNTER — LAB (OUTPATIENT)
Dept: LAB | Facility: LAB | Age: 75
End: 2025-01-24
Payer: COMMERCIAL

## 2025-01-24 DIAGNOSIS — E78.2 MIXED HYPERLIPIDEMIA: ICD-10-CM

## 2025-01-24 DIAGNOSIS — I10 BENIGN ESSENTIAL HTN: ICD-10-CM

## 2025-01-24 DIAGNOSIS — M85.88 OSTEOPENIA OF LUMBAR SPINE: ICD-10-CM

## 2025-01-24 DIAGNOSIS — E06.3 HYPOTHYROIDISM DUE TO HASHIMOTO'S THYROIDITIS: ICD-10-CM

## 2025-01-24 DIAGNOSIS — R73.01 FASTING HYPERGLYCEMIA: ICD-10-CM

## 2025-01-24 LAB
25(OH)D3 SERPL-MCNC: 59 NG/ML (ref 30–100)
ALBUMIN SERPL BCP-MCNC: 4.1 G/DL (ref 3.4–5)
ALP SERPL-CCNC: 46 U/L (ref 33–136)
ALT SERPL W P-5'-P-CCNC: 18 U/L (ref 7–45)
ANION GAP SERPL CALC-SCNC: 15 MMOL/L (ref 10–20)
AST SERPL W P-5'-P-CCNC: 19 U/L (ref 9–39)
BASOPHILS # BLD AUTO: 0.04 X10*3/UL (ref 0–0.1)
BASOPHILS NFR BLD AUTO: 0.7 %
BILIRUB SERPL-MCNC: 0.5 MG/DL (ref 0–1.2)
BUN SERPL-MCNC: 17 MG/DL (ref 6–23)
CALCIUM SERPL-MCNC: 9.7 MG/DL (ref 8.6–10.6)
CHLORIDE SERPL-SCNC: 109 MMOL/L (ref 98–107)
CHOLEST SERPL-MCNC: 252 MG/DL (ref 0–199)
CHOLESTEROL/HDL RATIO: 4.1
CO2 SERPL-SCNC: 25 MMOL/L (ref 21–32)
CREAT SERPL-MCNC: 0.87 MG/DL (ref 0.5–1.05)
CREAT UR-MCNC: 142.8 MG/DL (ref 20–320)
EGFRCR SERPLBLD CKD-EPI 2021: 70 ML/MIN/1.73M*2
EOSINOPHIL # BLD AUTO: 0.09 X10*3/UL (ref 0–0.4)
EOSINOPHIL NFR BLD AUTO: 1.5 %
ERYTHROCYTE [DISTWIDTH] IN BLOOD BY AUTOMATED COUNT: 13 % (ref 11.5–14.5)
EST. AVERAGE GLUCOSE BLD GHB EST-MCNC: 120 MG/DL
GLUCOSE SERPL-MCNC: 129 MG/DL (ref 74–99)
HBA1C MFR BLD: 5.8 %
HCT VFR BLD AUTO: 44.2 % (ref 36–46)
HDLC SERPL-MCNC: 61.3 MG/DL
HGB BLD-MCNC: 14.3 G/DL (ref 12–16)
IMM GRANULOCYTES # BLD AUTO: 0.02 X10*3/UL (ref 0–0.5)
IMM GRANULOCYTES NFR BLD AUTO: 0.3 % (ref 0–0.9)
LDLC SERPL CALC-MCNC: 167 MG/DL
LDLC SERPL DIRECT ASSAY-MCNC: 171 MG/DL (ref 0–129)
LYMPHOCYTES # BLD AUTO: 1.41 X10*3/UL (ref 0.8–3)
LYMPHOCYTES NFR BLD AUTO: 24.3 %
MCH RBC QN AUTO: 31.4 PG (ref 26–34)
MCHC RBC AUTO-ENTMCNC: 32.4 G/DL (ref 32–36)
MCV RBC AUTO: 97 FL (ref 80–100)
MICROALBUMIN UR-MCNC: <7 MG/L
MICROALBUMIN/CREAT UR: NORMAL MG/G{CREAT}
MONOCYTES # BLD AUTO: 0.58 X10*3/UL (ref 0.05–0.8)
MONOCYTES NFR BLD AUTO: 10 %
NEUTROPHILS # BLD AUTO: 3.67 X10*3/UL (ref 1.6–5.5)
NEUTROPHILS NFR BLD AUTO: 63.2 %
NON HDL CHOLESTEROL: 191 MG/DL (ref 0–149)
NRBC BLD-RTO: 0 /100 WBCS (ref 0–0)
PLATELET # BLD AUTO: 288 X10*3/UL (ref 150–450)
POTASSIUM SERPL-SCNC: 4.5 MMOL/L (ref 3.5–5.3)
PROT SERPL-MCNC: 7 G/DL (ref 6.4–8.2)
RBC # BLD AUTO: 4.55 X10*6/UL (ref 4–5.2)
SODIUM SERPL-SCNC: 144 MMOL/L (ref 136–145)
T4 FREE SERPL-MCNC: 1.28 NG/DL (ref 0.78–1.48)
TRIGL SERPL-MCNC: 117 MG/DL (ref 0–149)
TSH SERPL-ACNC: 4.44 MIU/L (ref 0.44–3.98)
VIT B12 SERPL-MCNC: 760 PG/ML (ref 211–911)
VLDL: 23 MG/DL (ref 0–40)
WBC # BLD AUTO: 5.8 X10*3/UL (ref 4.4–11.3)

## 2025-01-24 PROCEDURE — 82306 VITAMIN D 25 HYDROXY: CPT

## 2025-01-24 PROCEDURE — 84443 ASSAY THYROID STIM HORMONE: CPT

## 2025-01-24 PROCEDURE — 80061 LIPID PANEL: CPT

## 2025-01-24 PROCEDURE — 82607 VITAMIN B-12: CPT

## 2025-01-24 PROCEDURE — 83036 HEMOGLOBIN GLYCOSYLATED A1C: CPT

## 2025-01-24 PROCEDURE — 80053 COMPREHEN METABOLIC PANEL: CPT

## 2025-01-24 PROCEDURE — 82570 ASSAY OF URINE CREATININE: CPT

## 2025-01-24 PROCEDURE — 85025 COMPLETE CBC W/AUTO DIFF WBC: CPT

## 2025-01-24 PROCEDURE — 84439 ASSAY OF FREE THYROXINE: CPT

## 2025-01-24 PROCEDURE — 83721 ASSAY OF BLOOD LIPOPROTEIN: CPT

## 2025-01-24 PROCEDURE — 82043 UR ALBUMIN QUANTITATIVE: CPT

## 2025-01-27 NOTE — PROGRESS NOTES
Patient ID: Savanah Hernandez is a 74 y.o. female who presents for Follow-up (Pt here for follow up and review. Pt reports that her BP has been running very high in the 170's systolic. She has a diary of this with her. Does report h/a, denies visual disturbances, dizziness, chest pain, nausea or vomiting.)    LAST VISIT PLAN FROM: 08/12/2024:  Osteopenia: keep up with calcium vit d and wt bearing exercise  Patient Instructions:  Increase lisinopril to 30 mg once per day. We sent 30 mg tabs to the pharmacy: Take one per day.  Monitor bp at home, call if problems. Goal is 120/70 or close to it.  Since your blood pressure machine is accurate, home monitoring is acceptable.  Watch salt.  If you make a dramatic reduction in salt intake, and blood pressure drops below 105 systolic (top number) consistently, contact the office to discuss.  Follow up January/February: Fasting blood test in the few weeks prior to that visit.  Orders will be in for blood work.  Instructions for obtaining lab tests:   You do not need a paper requisition when obtaining tests at a  facility. No appointment is needed for lab tests.  For fasting blood tests:  Please do not consume calories for 10-12 hours prior to this blood test. It is ok to drink water, you should be hydrated.  Please do not take vitamins, supplements or thyroid medication before your blood is drawn this day.   Most lab results should be available for your review on the  My Chart portal within 48 hours. Please contact the office if you have not received results within 2 weeks of obtaining the test.  Annual physical/wellness visit at that time.  END LAST VISIT PLAN  -------------------------------------------  HPI  Patient is a 74-year-old female who presents for follow up.    Since last visit, patient underwent PVR and colonoscopy (pathology pending) (see Objective).    We discussed her fasting blood work including elevated glucose levels over 125 mg/dL, kidney function  normal, and elevated TSH.  Will repeat blood work in 8 weeks after medication adjustment.      Stage II Cystocele with Prolapse:  Patient underwent PVR and saw Urology, MURIEL Pizano, on 11/14/2024. She continues on topical Estrace 0.01%; 0.5 grams 2-3 times per week.    Hypertension:  BP today is 186/94 with HR 78.  Recheck by me was 164/100 with HR 76.  Patient reports that her BP has been running high in the 140s-170s systolic at home. At a visit with Danita Zamora on 11/14/2024, BP was 148/80.  Denies precipitating illness.  Heart rates she reports have been 62-82.  We discussed salt intake.  Patient will be more conscious of her salt intake going forward.  Follow up on cardiovascular conditions:  Patient continues on lisinopril 30 mg daily.  We discussed increasing her lisinopril to 40 mg daily.  Advised continued monitoring at home and call if BP is not improving.  We discussed that I would like to see her BP around 150 before her trip in 2 weeks with goal of 120/70.  Cardiac:   Chest pain?No  Palpitations? No  Increased gates?  No  Other:  Resp:   Shortness of breath?No  Wheezing No  Cough No  Other:  Extremities:   Leg or ankle swelling?No   Claudication?No  Other:  Neuro:  DizzinessNo  SyncopeNo   Blurred visionNo  New headaches No  Other:  Medications:Taking them regularly.Yes  Side effects.No    Hyperlipidemia:  No myalgias, nausea, abdominal pain.  Meds: Taking regularly, no adverse effect.  Patient continues on Zetia 10 mg daily. We discussed elevated cholesterol and LDL shown on her latest blood work.  Will revisit at next visit, as we are making 2 medication changes today (levothyroxine and lisinopril and patient will have 2 trips out of state from now until April, and I do not want to make too many changes at once.  Labs: Last LDL direct was 171 mg/dL with cholesterol at 252 mg/dL on 01/24/2025.  LDL goal: <100 mg/dL.  Coronary artery calcium score of 0 on 06/06/2022. ASCVD risk  37.5%.  Recommended Mediterranean diet, exercise 30 minutes per day or more, include weight bearing exercise and flexibility/strengthening.  Keep regular sleep habits, 7 to 8 hours per night.  Hydrate 64 oz water per day.    Hypothyroidism:  Patient continues on levothyroxine 50 mcg daily.  Last TSH was elevated at 4.44 on 01/24/2025.  We discussed increasing her levothyroxine to 75 mcg daily when she returns from [place] on February 23, 2025 with repeat blood work in 8 weeks.     Social History:  Patient reports going to [employer] in the next two weeks.  She is also going on a river cruise April 15-24.     Orders placed for prescriptions as required.    Orders placed for blood work as required.    Follow up 04/30/2025.    Review of Systems  See ROS in HPI    Current Outpatient Medications   Medication Instructions    ascorbic acid (VITAMIN C) 1,000 mg, Daily    BACILLUS COAGULANS-INULIN ORAL 1 tablet, Daily RT    calcium-magnesium 300-300 mg tablet 1 tablet, Daily    cholecalciferol (vitamin D3) 5,000 Units, Daily    cinnamon (CINNAMON) 500 mg, 2 times daily    ELDERBERRY FRUIT ORAL 1 tablet, Daily    estradiol (Estrace) 0.01 % (0.1 mg/gram) vaginal cream Apply pea size amount 0.5 gram to vaginal opening  2-3 times per week.    ezetimibe (ZETIA) 10 mg, oral, Every other day    britta root (britta, Zingiber officinalis,) 550 mg capsule 1 tablet, 2 times daily    levothyroxine (SYNTHROID, LEVOXYL) 75 mcg, oral, Daily    lisinopril 40 mg, oral, Daily    metroNIDAZOLE (Metrogel) 0.75 % gel 1 Application, Topical, 2 times daily    multivitamin tablet 1 tablet, Daily    niacin 500 mg, Daily with breakfast    om3-dha-epa-cod liver-vit A-D3 240-1,000 mg capsule Take by mouth.    TURMERIC ORAL 3 tablets, Daily    ZINC CITRATE ORAL 2 tablets, Daily     Allergies   Allergen Reactions    Tetanus And Diphtheria Toxoids, Adsorbed, Adult Dizziness    Latex Rash     Objective    The following test results have been reviewed:  PVR  11/14/2024:  Impression  PVR 2.   Specimen Collected: 11/14/24 11:10 Last Resulted: 11/14/24 11:17       Order Details          View Encounter          Lab and Collection Details          Routing          Result History       View All Conversations on this Encounter       Colonoscopy 12/12/2024:    Latest Complete Lab Results:  CBC:   Lab Results   Component Value Date    WBC 5.8 01/24/2025    RBC 4.55 01/24/2025    HGB 14.3 01/24/2025    HCT 44.2 01/24/2025    MCV 97 01/24/2025    MCH 31.4 01/24/2025    MCHC 32.4 01/24/2025     01/24/2025     CMP:  Lab Results   Component Value Date    GLUCOSE 129 (H) 01/24/2025     01/24/2025    K 4.5 01/24/2025     (H) 01/24/2025    CO2 25 01/24/2025    ANIONGAP 15 01/24/2025    BUN 17 01/24/2025    CREATININE 0.87 01/24/2025    GFRF 73 03/10/2023    CALCIUM 9.7 01/24/2025    ALBUMIN 4.1 01/24/2025    ALKPHOS 46 01/24/2025    PROT 7.0 01/24/2025    AST 19 01/24/2025    BILITOT 0.5 01/24/2025    ALT 18 01/24/2025      Lipid:   Lab Results   Component Value Date    CHOL 252 (H) 01/24/2025    HDL 61.3 01/24/2025    CHHDL 4.1 01/24/2025    LDLF 159 (H) 07/07/2023    VLDL 23 01/24/2025    TRIG 117 01/24/2025     LDL Direct:  Lab Results   Component Value Date    LDLDIRECT 171 (H) 01/24/2025      TSH:   Lab Results   Component Value Date    TSH 4.44 (H) 01/24/2025      B12:  Lab Results   Component Value Date    GOAKFEAV72 760 01/24/2025     Vitamin D:  Lab Results   Component Value Date    VITD25 59 01/24/2025      HgA1c:   Lab Results   Component Value Date    HGBA1C 5.8 (H) 01/24/2025    [id number] 120 01/24/2025       Physical Exam  Vitals:      5/22/2024    12:44 PM 8/12/2024     3:01 PM 8/12/2024     3:53 PM 11/14/2024    11:05 AM 2/4/2025     1:59 PM 2/4/2025     2:17 PM 2/4/2025     2:46 PM   Vitals   Systolic  134 130 148 198 186 164   Diastolic  88 88 85 98 94 100   BP Location   Right arm    Left arm   Heart Rate  60  94 84 78 76   Temp    36.7 °C (98 °F)   "    Resp  18   18     Height 1.6 m (5' 3\") 1.626 m (5' 4\")  1.6 m (5' 3\") 1.6 m (5' 3\")     Weight (lb) 173 177  170 177     BMI 30.65 kg/m2 30.38 kg/m2  30.11 kg/m2 31.35 kg/m2     BSA (m2) 1.87 m2 1.9 m2  1.85 m2 1.89 m2       Patient looks well and is in no obvious distress.  Bilateral auricles are red.    HEENT:   Normocephalic, no facial asymmetry  Eyes: Sclera and conjunctiva are clear.  Neck: No adenopathy cervical (Ant/post/lat), no Supraclavicular nodes.   Thyroid normal.  Carotid pulses normal, no carotid bruits.  Lungs : RR normal. Clear to auscultation anterior, posterior and lateral. No rales, wheezes rhonchi or rubs. Good air exchange.  Heart: RRR. No Murmur, gallop, click or rub.  Abdomen: Bowel sounds normal, No bruits. No pulsatile mass. No hepatosplenomegaly, masses or tenderness. Soft, no guarding.  Vascular:  Posterior tibialis and dorsalis pedis pulses within normal limits bilaterally.   Extremities: No upper extremity edema. No lower extremity edema.   Musculoskeletal: No synovitis of joints seen. No new deformity.  Neuro: CN 2-12 intact. Alert, appropriate.  Ambulates independently.  No gross motor deficit.   No tremors.  Psych: normal mood and affect.  Skin: No rash, bruising petechiae or jaundice.    Breast Exam : Symmetric appearance. No masses, nipple discharge, skin retraction, axillary or supraclavicular adenopathy.    EKG today showed normal sinus rhythm and was unchanged since 2020.  Echocardiogram in 2017 showed the following:   The left ventricular systolic function is normal.    Spectral Doppler shows an impaired relaxation pattern of left ventricular diastolic filling.    Savanah Patel was seen today for follow-up.  Diagnoses and all orders for this visit:  Benign essential HTN (Primary)  -     lisinopril 40 mg tablet; Take 1 tablet (40 mg) by mouth once daily.  -     Basic metabolic panel; Future  Mixed hyperlipidemia  Hypothyroidism due to Hashimoto's thyroiditis  -     " levothyroxine (Synthroid, Levoxyl) 75 mcg tablet; Take 1 tablet (75 mcg) by mouth once daily.  -     Cancel: TSH with reflex to Free T4 if abnormal; Future  -     TSH with reflex to Free T4 if abnormal; Future  Medication monitoring encounter  -     Cancel: TSH with reflex to Free T4 if abnormal; Future  -     TSH with reflex to Free T4 if abnormal; Future  -     Basic metabolic panel; Future  Fasting hyperglycemia  -     Cancel: Glucose, fasting; Future     Problem List Items Addressed This Visit       Benign essential HTN - Primary    Relevant Medications    lisinopril 40 mg tablet    Other Relevant Orders    Basic metabolic panel    Fasting hyperglycemia    Hyperlipidemia    Hypothyroidism due to Hashimoto's thyroiditis    Relevant Medications    levothyroxine (Synthroid, Levoxyl) 75 mcg tablet    Other Relevant Orders    TSH with reflex to Free T4 if abnormal     Other Visit Diagnoses       Medication monitoring encounter        Relevant Orders    TSH with reflex to Free T4 if abnormal    Basic metabolic panel          See patient instructions in wrap up plan, orders and comments for treatment plan.  Patient Instructions:         I am the Internal Medicine physician providing ongoing chronic medical care for this patient, which is managed during and in between office visits.    Scribe Attestation  By signing my name below, INaty, Sarah   attest that this documentation has been prepared under the direction and in the presence of Ketty Puente MD.

## 2025-02-04 ENCOUNTER — APPOINTMENT (OUTPATIENT)
Dept: PRIMARY CARE | Facility: CLINIC | Age: 75
End: 2025-02-04
Payer: COMMERCIAL

## 2025-02-04 VITALS
HEIGHT: 63 IN | SYSTOLIC BLOOD PRESSURE: 164 MMHG | HEART RATE: 76 BPM | DIASTOLIC BLOOD PRESSURE: 100 MMHG | RESPIRATION RATE: 18 BRPM | BODY MASS INDEX: 31.36 KG/M2 | WEIGHT: 177 LBS

## 2025-02-04 DIAGNOSIS — E06.3 HYPOTHYROIDISM DUE TO HASHIMOTO'S THYROIDITIS: ICD-10-CM

## 2025-02-04 DIAGNOSIS — E78.2 MIXED HYPERLIPIDEMIA: ICD-10-CM

## 2025-02-04 DIAGNOSIS — Z51.81 MEDICATION MONITORING ENCOUNTER: ICD-10-CM

## 2025-02-04 DIAGNOSIS — Z79.899 MEDICATION DOSE CHANGED: ICD-10-CM

## 2025-02-04 DIAGNOSIS — R73.01 FASTING HYPERGLYCEMIA: ICD-10-CM

## 2025-02-04 DIAGNOSIS — I10 BENIGN ESSENTIAL HTN: Primary | ICD-10-CM

## 2025-02-04 DIAGNOSIS — Z12.31 VISIT FOR SCREENING MAMMOGRAM: ICD-10-CM

## 2025-02-04 PROCEDURE — 93000 ELECTROCARDIOGRAM COMPLETE: CPT | Performed by: INTERNAL MEDICINE

## 2025-02-04 RX ORDER — LISINOPRIL 40 MG/1
40 TABLET ORAL DAILY
Qty: 90 TABLET | Refills: 0 | Status: SHIPPED | OUTPATIENT
Start: 2025-02-04

## 2025-02-04 RX ORDER — LEVOTHYROXINE SODIUM 75 UG/1
75 TABLET ORAL DAILY
Qty: 90 TABLET | Refills: 0 | Status: SHIPPED | OUTPATIENT
Start: 2025-02-04

## 2025-02-04 ASSESSMENT — PAIN SCALES - GENERAL: PAINLEVEL_OUTOF10: 0-NO PAIN

## 2025-02-04 NOTE — PATIENT INSTRUCTIONS
Levothyroxine: when you return from [place] on feb 23rd,  increase levothyroxine to 75 mcg daily-I sent a new prescription for the 75 mcg tabs to the pharmacy: Please just set aside your remaining 50 mcg tabs .    NOW:Increase lisinopril to 40 mg daily.  Call or message Monday if bp is not improving. Would like it 150 or less for your trip and long term 120/70 is the goal.  Less sodium in diet.    Keep April appt.  Fasting thyroid and glucose test the week or at least a day before your April appt. Do not take vitamins supplements or thyroid pill that am until after your blood is drawn.    Will discuss dosing of above meds, and also discuss glucose and cholesterol at that visit.     Call if any other issues.    Mediterranean diet, dash diet (sent) recommended.

## 2025-04-22 NOTE — PROGRESS NOTES
Subjective   Patient ID: Savanah Hernandez is a 74 y.o. female who presents for Annual Physical and follow up on conditions.Just returned from river cruise in [place] and prior to that was [place].  carbajal a bad cold and then she got  a cold a week ago. Has a little sneezing that is it.never a fever/runny nose sore throat.    (LAST VISIT PLAN FROM: 02/04/2025:  Will add mammogram order as due in may.  See patient instructions in wrap up plan, orders and comments for treatment plan.  Patient Instructions:  Levothyroxine: when you return from [place] on feb 23rd,  increase levothyroxine to 75 mcg daily-I sent a new prescription for the 75 mcg tabs to the pharmacy: Please just set aside your remaining 50 mcg tabs .  NOW:Increase lisinopril to 40 mg daily.  Call or message Monday if bp is not improving. Would like it 150 or less for your trip and long term 120/70 is the goal.  Less sodium in diet.  Keep April appt.  Fasting thyroid and glucose test the week or at least a day before your April appt. Do not take vitamins supplements or thyroid pill that am until after your blood is drawn.  Will discuss dosing of above meds, and also discuss glucose and cholesterol at that visit.   Call if any other issues.  Mediterranean diet, dash diet (sent) recommended.  END INFO FROM PRIOR VISIT)  -------------------------------------------  HPI    See subjective above. Feeling well. Recovering from mild uri. No complaints.  Health maintenance items last completed:  Colonoscopy: 12/12/2024; 3 mm poly (cecum), diverticulosis, 3 mm polyp (rectum), and external hemorrhoids.  Pathology not found.  Mammogram: 05/22/2024; No mammographic evidence of malignancy.  BI-RADS Category:  1 Negative.  Recommendation:  Annual Screening. Due 05/22/2025.  Order has been placed (02/15/2025) for repeat.  Bone Density: 05/22/2024;- osteopenia of spine L1-L4 with T-score of -1.5, left femur total T-score of -1.5, and left femur neck T-score at -2.0. Due  05/22/2026.  Urine albumin if HTN or DM2: Albumin in urine was <7.0 mg/L on 01/24/2025.  Pap: 02/2020- negative; always normal.  Pelvic: 02/2020; she would prefer not doing these as a screening exam anymore, and I am ok with that.  Breast exam in office: annual Today.  Vaccines due or not completed: Declines viral vaccines; influenza, Covid, T-dap (bad reaction to TD).  Did review them all with her.  P-13 completed 01/21/2019.  Last Health Maintenance exam: 02/13/2024.    Patient reports having regular eye and dental examinations.    Stage II Cystocele with Prolapse:  Patient underwent PVR and last saw Urology, Danita Zamora APRN-DAVID, on 11/14/2024. She continues on topical Estrace 0.01%; 0.5 grams 2-3 times per week.    Hypertension:  BP today is 116/73.  Bmp just completed normal.  Follow up on cardiovascular conditions: Patient continues on lisinopril 40 mg daily.  No adverse effect sinceincreasing the dose  Cardiac:   Chest pain?No  Palpitations? No  Increased gates?  No  Other:  Resp:   Shortness of breath?No  Wheezing No  Cough No  Other:  Extremities:   Leg or ankle swelling?No   Claudication?No  Other:  Neuro:  DizzinessNo  SyncopeNo   Blurred visionNo  New headaches No  Other:  Medications:Taking them regularly.Yes  Side effects.No    Hyperlipidemia:  No myalgias, nausea, abdominal pain.  Meds: Taking regularly, no adverse effect.  Patient continues on Zetia 10 mg daily.   Labs: Last LDL direct was 171 mg/dL with cholesterol at 252 mg/dL on 01/24/2025.  LDL goal: <100 mg/dL.  Coronary artery calcium score of 0 on 06/06/2022. ASCVD risk 37.5%.  Discussed adding statin medication at last visit but held off to discuss today due to other medication changes made at last visit.    Hypothyroidism:  Patient continues on levothyroxine 75 mcg daily.  Last TSH was elevated at 4.44 on 01/24/2025.   Patient was supposed to repeat TSH after returning from [place] (02/23/2025), 8 weeks after starting the increase of  levothyroxine from 50 mcg to 75 mcg daily. She just completed this test and normal, will continue the current doseand refill done.    Follow up 6 months     Review of Systems  All systems reviewed and are negative unless otherwise stated in HPI or noted in writing on the written   3  page history and review of systems document reviewed by me and  scanned in with this visit. This is scanned either to notes or to media, with today's date.    Current Outpatient Medications   Medication Instructions    ascorbic acid (VITAMIN C) 1,000 mg, Daily    BACILLUS COAGULANS-INULIN ORAL 1 tablet, Daily RT    calcium-magnesium 300-300 mg tablet 1 tablet, Daily    cholecalciferol (vitamin D3) 5,000 Units, Daily    cinnamon (CINNAMON) 500 mg, 2 times daily    ELDERBERRY FRUIT ORAL 1 tablet, Daily    estradiol (Estrace) 0.01 % (0.1 mg/gram) vaginal cream Apply pea size amount 0.5 gram to vaginal opening  2-3 times per week.    ezetimibe (ZETIA) 10 mg, oral, Every other day    britta root (britta, Zingiber officinalis,) 550 mg capsule 1 tablet, 2 times daily    levothyroxine (SYNTHROID, LEVOXYL) 75 mcg, oral, Daily    lisinopril 40 mg, oral, Daily    metroNIDAZOLE (Metrogel) 0.75 % gel 1 Application, Topical, 2 times daily    multivitamin tablet 1 tablet, Daily    niacin 500 mg, Daily with breakfast    om3-dha-epa-cod liver-vit A-D3 240-1,000 mg capsule Take by mouth.    TURMERIC ORAL 3 tablets, Daily    ZINC CITRATE ORAL 2 tablets, Daily     RX Allergies[1]  Objective   The following test results have been reviewed:  Latest Complete Lab Results:  CBC w/Diff:   Lab Results   Component Value Date    WBC 5.8 01/24/2025    NRBC 0.0 01/24/2025    RBC 4.55 01/24/2025    HGB 14.3 01/24/2025    HCT 44.2 01/24/2025    MCV 97 01/24/2025    MCHC 32.4 01/24/2025     01/24/2025    RDW 13.0 01/24/2025    NEUTOPHILPCT 63.2 01/24/2025    IGPCT 0.3 01/24/2025    LYMPHOPCT 24.3 01/24/2025    MONOPCT 10.0 01/24/2025    EOSPCT 1.5 01/24/2025     "BASOPCT 0.7 01/24/2025    NEUTROABS 3.67 01/24/2025    LYMPHSABS 1.41 01/24/2025    MONOSABS 0.58 01/24/2025    EOSABS 0.09 01/24/2025    BASOSABS 0.04 01/24/2025        CMP:  Lab Results   Component Value Date    GLUCOSE 115 04/28/2025     04/28/2025    K 4.0 04/28/2025     04/28/2025    CO2 28 04/28/2025    ANIONGAP 11 04/28/2025    BUN 14 04/28/2025    CREATININE 0.79 04/28/2025    GFRF 73 03/10/2023    CALCIUM 9.7 04/28/2025    ALBUMIN 4.1 01/24/2025    ALKPHOS 46 01/24/2025    PROT 7.0 01/24/2025    AST 19 01/24/2025    BILITOT 0.5 01/24/2025    ALT 18 01/24/2025        Lipid:   Lab Results   Component Value Date    CHOL 252 (H) 01/24/2025    HDL 61.3 01/24/2025    CHHDL 4.1 01/24/2025    LDLF 159 (H) 07/07/2023    VLDL 23 01/24/2025    TRIG 117 01/24/2025       LDL Direct:  Lab Results   Component Value Date    LDLDIRECT 171 (H) 01/24/2025        TSH:   Lab Results   Component Value Date    TSH 2.73 04/28/2025        B12:  Lab Results   Component Value Date    [id number] 760 01/24/2025       Vitamin D:  Lab Results   Component Value Date    VITD25 59 01/24/2025        HgA1c:   Lab Results   Component Value Date    HGBA1C 5.8 (H) 01/24/2025    [id number] 120 01/24/2025       Physical Exam      8/12/2024     3:01 PM 8/12/2024     3:53 PM 11/14/2024    11:05 AM 2/4/2025     1:59 PM 2/4/2025     2:17 PM 2/4/2025     2:46 PM 4/30/2025     3:21 PM   Vitals   Systolic 134 130 148 198 186 164 116   Diastolic 88 88 85 98 94 100 73   BP Location  Right arm    Left arm    Heart Rate 60  94 84 78 76 77   Temp   36.7 °C (98 °F)       Resp 18   18   18   Height 1.626 m (5' 4\")  1.6 m (5' 3\") 1.6 m (5' 3\")   1.575 m (5' 2\")   Weight (lb) 177  170 177   173   BMI 30.38 kg/m2  30.11 kg/m2 31.35 kg/m2   31.64 kg/m2   BSA (m2) 1.9 m2  1.85 m2 1.89 m2   1.85 m2   Visit Report Report Report Report Report Report Report Report     General: Patient is known to me, is in their  usual state of health, with  no obvious " distress.  Head: Normocephalic  Eyes: PERRL, EOMI, no scleral icterus or conjunctival abnormality.  Ears: Normal canals and TM's  Oropharynx: Well hydrated mucosa. no lesions, erythema. palate moves well.  Neck: No masses, no cervical (A/P/ or Lateral) adenopathy. Thyroid not enlarged and no nodules. Carotid pulses normal and no bruits.  Chest: Normal AP diameter. No supraclavicular adenopathy.  Lungs: Clear to auscultation: no rales , wheezes, rhonchi, rubs, examined bilaterally, ant/post /lateral. RR normal.  Heart: RRR. No MGCR S1 S2 normal.  Abdomen: BS normal, no bruits. No hepatosplenomegaly. No abdominal mass or tenderness. No distension.  Extremities: No upper extremity edema. No lower leg edema.  No ischemia.   Joints: No synovitis seen. No deformities.  Pulses: Normal posterior tibialis pulses, normal dorsalis pedis pulses. normal radial pulses. Normal femoral pulses without bruits.  Neuro: CN 2-12 intact . Alert, oriented, appropriate.  No focal weakness observed. No tremors. Ambulation is normal .  Psych: Mood and affect normal. No cognitive deficits noted during this exam. Alert, oriented, appropriate.  Skin: No rash, petechiae or excessive bruising.  Lymph: No SC axillary or inguinal adenopathy    Breast Exam : Symmetric appearance. No masses, nipple discharge, skin retraction, axillary or supraclavicular adenopathy.  Savanah Patel was seen today for annual exam.  Diagnoses and all orders for this visit:  Encounter for annual physical exam (Primary)  Benign essential HTN  -     lisinopril 40 mg tablet; Take 1 tablet (40 mg) by mouth once daily.  Mixed hyperlipidemia  Hypothyroidism due to Hashimoto's thyroiditis  -     levothyroxine (Synthroid, Levoxyl) 75 mcg tablet; Take 1 tablet (75 mcg) by mouth once daily.  Hyperlipidemia, unspecified hyperlipidemia type  -     ezetimibe (Zetia) 10 mg tablet; Take 1 tablet (10 mg) by mouth every other day.  Medication monitoring encounter  Full code status    Annual   history and physical completed including ROS, PE, review of chronic issues, immunizations,   results of testing and health maintenance.  Functionality and pain were assessed.   Cancer screening testing was addressed as appropriate-see patient discussion/orders.   Medications were discussed and reviewed/reconciled and refill need assessed/handled.   Full code  Non smoker, no alcohol use.    Doing well on inc lisinopril and levothyroxine, continue same, follow up 6 months.no labs are due in 6 months.discussed prevnar 20. She declines all viral vaccines.    See patient instructions in wrap up plan, orders and comments for treatment plan.  Patient Instructions:    I am the Internal Medicine physician providing ongoing chronic medical care for this patient, which is managed during and in between office visits.    Scribe Attestation  By signing my name below, INaty Scribe   attest that this documentation has been prepared under the direction and in the presence of Ketty Puente MD.   Provider attestation-scribe documentation  Any medical record entries made by the professional scribe were at my discretion.  I have reviewed the chart and agree that the record accurately reflects my personal performance of the history, physical exam, discussion and plan.                 [1]   Allergies  Allergen Reactions    Tetanus And Diphtheria Toxoids, Adsorbed, Adult Dizziness    Latex Rash

## 2025-04-26 DIAGNOSIS — Z51.81 MEDICATION MONITORING ENCOUNTER: ICD-10-CM

## 2025-04-26 DIAGNOSIS — E06.3 HYPOTHYROIDISM DUE TO HASHIMOTO'S THYROIDITIS: ICD-10-CM

## 2025-04-26 DIAGNOSIS — I10 BENIGN ESSENTIAL HTN: ICD-10-CM

## 2025-04-29 LAB
ANION GAP SERPL CALCULATED.4IONS-SCNC: 11 MMOL/L (CALC) (ref 7–17)
BUN SERPL-MCNC: 14 MG/DL (ref 7–25)
BUN/CREAT SERPL: NORMAL (CALC) (ref 6–22)
CALCIUM SERPL-MCNC: 9.7 MG/DL (ref 8.6–10.4)
CHLORIDE SERPL-SCNC: 101 MMOL/L (ref 98–110)
CO2 SERPL-SCNC: 28 MMOL/L (ref 20–32)
CREAT SERPL-MCNC: 0.79 MG/DL (ref 0.6–1)
EGFRCR SERPLBLD CKD-EPI 2021: 79 ML/MIN/1.73M2
GLUCOSE SERPL-MCNC: 115 MG/DL (ref 65–139)
POTASSIUM SERPL-SCNC: 4 MMOL/L (ref 3.5–5.3)
SODIUM SERPL-SCNC: 140 MMOL/L (ref 135–146)
TSH SERPL-ACNC: 2.73 MIU/L (ref 0.4–4.5)

## 2025-04-30 ENCOUNTER — APPOINTMENT (OUTPATIENT)
Dept: PRIMARY CARE | Facility: CLINIC | Age: 75
End: 2025-04-30
Payer: COMMERCIAL

## 2025-04-30 VITALS
WEIGHT: 173 LBS | SYSTOLIC BLOOD PRESSURE: 116 MMHG | HEART RATE: 77 BPM | HEIGHT: 62 IN | RESPIRATION RATE: 18 BRPM | BODY MASS INDEX: 31.83 KG/M2 | DIASTOLIC BLOOD PRESSURE: 73 MMHG

## 2025-04-30 DIAGNOSIS — E78.2 MIXED HYPERLIPIDEMIA: ICD-10-CM

## 2025-04-30 DIAGNOSIS — Z00.00 ENCOUNTER FOR ANNUAL PHYSICAL EXAM: Primary | ICD-10-CM

## 2025-04-30 DIAGNOSIS — Z51.81 MEDICATION MONITORING ENCOUNTER: ICD-10-CM

## 2025-04-30 DIAGNOSIS — I10 BENIGN ESSENTIAL HTN: ICD-10-CM

## 2025-04-30 DIAGNOSIS — E06.3 HYPOTHYROIDISM DUE TO HASHIMOTO'S THYROIDITIS: ICD-10-CM

## 2025-04-30 DIAGNOSIS — Z78.9 FULL CODE STATUS: ICD-10-CM

## 2025-04-30 DIAGNOSIS — E78.5 HYPERLIPIDEMIA, UNSPECIFIED HYPERLIPIDEMIA TYPE: ICD-10-CM

## 2025-04-30 PROCEDURE — 1126F AMNT PAIN NOTED NONE PRSNT: CPT | Performed by: INTERNAL MEDICINE

## 2025-04-30 PROCEDURE — 1036F TOBACCO NON-USER: CPT | Performed by: INTERNAL MEDICINE

## 2025-04-30 PROCEDURE — 3078F DIAST BP <80 MM HG: CPT | Performed by: INTERNAL MEDICINE

## 2025-04-30 PROCEDURE — 1160F RVW MEDS BY RX/DR IN RCRD: CPT | Performed by: INTERNAL MEDICINE

## 2025-04-30 PROCEDURE — 3074F SYST BP LT 130 MM HG: CPT | Performed by: INTERNAL MEDICINE

## 2025-04-30 PROCEDURE — 1159F MED LIST DOCD IN RCRD: CPT | Performed by: INTERNAL MEDICINE

## 2025-04-30 PROCEDURE — 1158F ADVNC CARE PLAN TLK DOCD: CPT | Performed by: INTERNAL MEDICINE

## 2025-04-30 PROCEDURE — 99397 PER PM REEVAL EST PAT 65+ YR: CPT | Performed by: INTERNAL MEDICINE

## 2025-04-30 PROCEDURE — 3008F BODY MASS INDEX DOCD: CPT | Performed by: INTERNAL MEDICINE

## 2025-04-30 PROCEDURE — 1170F FXNL STATUS ASSESSED: CPT | Performed by: INTERNAL MEDICINE

## 2025-04-30 PROCEDURE — 1157F ADVNC CARE PLAN IN RCRD: CPT | Performed by: INTERNAL MEDICINE

## 2025-04-30 PROCEDURE — 1123F ACP DISCUSS/DSCN MKR DOCD: CPT | Performed by: INTERNAL MEDICINE

## 2025-04-30 RX ORDER — EZETIMIBE 10 MG/1
10 TABLET ORAL EVERY OTHER DAY
Qty: 45 TABLET | Refills: 3 | Status: SHIPPED | OUTPATIENT
Start: 2025-04-30

## 2025-04-30 RX ORDER — LEVOTHYROXINE SODIUM 75 UG/1
75 TABLET ORAL DAILY
Qty: 90 TABLET | Refills: 3 | Status: SHIPPED | OUTPATIENT
Start: 2025-04-30

## 2025-04-30 RX ORDER — LISINOPRIL 40 MG/1
40 TABLET ORAL DAILY
Qty: 90 TABLET | Refills: 3 | Status: SHIPPED | OUTPATIENT
Start: 2025-04-30

## 2025-04-30 SDOH — ECONOMIC STABILITY: FOOD INSECURITY: WITHIN THE PAST 12 MONTHS, THE FOOD YOU BOUGHT JUST DIDN'T LAST AND YOU DIDN'T HAVE MONEY TO GET MORE.: NEVER TRUE

## 2025-04-30 SDOH — ECONOMIC STABILITY: INCOME INSECURITY: IN THE LAST 12 MONTHS, WAS THERE A TIME WHEN YOU WERE NOT ABLE TO PAY THE MORTGAGE OR RENT ON TIME?: NO

## 2025-04-30 SDOH — ECONOMIC STABILITY: FOOD INSECURITY: WITHIN THE PAST 12 MONTHS, YOU WORRIED THAT YOUR FOOD WOULD RUN OUT BEFORE YOU GOT MONEY TO BUY MORE.: NEVER TRUE

## 2025-04-30 SDOH — HEALTH STABILITY: PHYSICAL HEALTH: ON AVERAGE, HOW MANY DAYS PER WEEK DO YOU ENGAGE IN MODERATE TO STRENUOUS EXERCISE (LIKE A BRISK WALK)?: 6 DAYS

## 2025-04-30 SDOH — HEALTH STABILITY: PHYSICAL HEALTH: ON AVERAGE, HOW MANY MINUTES DO YOU ENGAGE IN EXERCISE AT THIS LEVEL?: 40 MIN

## 2025-04-30 ASSESSMENT — PROMIS GLOBAL HEALTH SCALE
RATE_MENTAL_HEALTH: VERY GOOD
RATE_AVERAGE_PAIN: 1
RATE_SOCIAL_SATISFACTION: VERY GOOD
CARRYOUT_PHYSICAL_ACTIVITIES: COMPLETELY
RATE_GENERAL_HEALTH: VERY GOOD
CARRYOUT_SOCIAL_ACTIVITIES: VERY GOOD
EMOTIONAL_PROBLEMS: NEVER
RATE_QUALITY_OF_LIFE: VERY GOOD
RATE_PHYSICAL_HEALTH: VERY GOOD

## 2025-04-30 ASSESSMENT — ACTIVITIES OF DAILY LIVING (ADL)
PATIENT'S MEMORY ADEQUATE TO SAFELY COMPLETE DAILY ACTIVITIES?: YES
DRESSING YOURSELF: INDEPENDENT
PILL BOX USED: NO
TOILETING: INDEPENDENT
GROCERY SHOPPING: INDEPENDENT
BATHING: INDEPENDENT
GROOMING: INDEPENDENT
USING TELEPHONE: INDEPENDENT
JUDGMENT_ADEQUATE_SAFELY_COMPLETE_DAILY_ACTIVITIES: YES
DOING HOUSEWORK: INDEPENDENT
FEEDING YOURSELF: INDEPENDENT
EATING: INDEPENDENT
STIL DRIVING: YES
TAKING MEDICATION: INDEPENDENT
PREPARING MEALS: INDEPENDENT
WALKS IN HOME: INDEPENDENT
HEARING - LEFT EAR: FUNCTIONAL
ADEQUATE_TO_COMPLETE_ADL: YES
NEEDS ASSISTANCE WITH FOOD: INDEPENDENT
MANAGING FINANCES: INDEPENDENT
HEARING - RIGHT EAR: FUNCTIONAL
USING TRANSPORTATION: INDEPENDENT

## 2025-04-30 ASSESSMENT — SOCIAL DETERMINANTS OF HEALTH (SDOH)
IN THE PAST 12 MONTHS, HAS THE ELECTRIC, GAS, OIL, OR WATER COMPANY THREATENED TO SHUT OFF SERVICE IN YOUR HOME?: NO
HOW OFTEN DO YOU ATTENT MEETINGS OF THE CLUB OR ORGANIZATION YOU BELONG TO?: NEVER
DO YOU BELONG TO ANY CLUBS OR ORGANIZATIONS SUCH AS CHURCH GROUPS UNIONS, FRATERNAL OR ATHLETIC GROUPS, OR SCHOOL GROUPS?: NO
WITHIN THE LAST YEAR, HAVE YOU BEEN HUMILIATED OR EMOTIONALLY ABUSED IN OTHER WAYS BY YOUR PARTNER OR EX-PARTNER?: NO
HOW HARD IS IT FOR YOU TO PAY FOR THE VERY BASICS LIKE FOOD, HOUSING, MEDICAL CARE, AND HEATING?: NOT VERY HARD
WITHIN THE LAST YEAR, HAVE TO BEEN RAPED OR FORCED TO HAVE ANY KIND OF SEXUAL ACTIVITY BY YOUR PARTNER OR EX-PARTNER?: NO
HOW OFTEN DO YOU GET TOGETHER WITH FRIENDS OR RELATIVES?: TWICE A WEEK
WITHIN THE LAST YEAR, HAVE YOU BEEN KICKED, HIT, SLAPPED, OR OTHERWISE PHYSICALLY HURT BY YOUR PARTNER OR EX-PARTNER?: NO
HOW OFTEN DO YOU ATTEND CHURCH OR RELIGIOUS SERVICES?: NEVER
IN A TYPICAL WEEK, HOW MANY TIMES DO YOU TALK ON THE PHONE WITH FAMILY, FRIENDS, OR NEIGHBORS?: MORE THAN THREE TIMES A WEEK
WITHIN THE LAST YEAR, HAVE YOU BEEN AFRAID OF YOUR PARTNER OR EX-PARTNER?: NO

## 2025-04-30 ASSESSMENT — ANXIETY QUESTIONNAIRES
5. BEING SO RESTLESS THAT IT IS HARD TO SIT STILL: NOT AT ALL
4. TROUBLE RELAXING: NOT AT ALL
1. FEELING NERVOUS, ANXIOUS, OR ON EDGE: NOT AT ALL
2. NOT BEING ABLE TO STOP OR CONTROL WORRYING: NOT AT ALL
IF YOU CHECKED OFF ANY PROBLEMS ON THIS QUESTIONNAIRE, HOW DIFFICULT HAVE THESE PROBLEMS MADE IT FOR YOU TO DO YOUR WORK, TAKE CARE OF THINGS AT HOME, OR GET ALONG WITH OTHER PEOPLE: NOT DIFFICULT AT ALL
7. FEELING AFRAID AS IF SOMETHING AWFUL MIGHT HAPPEN: NOT AT ALL
6. BECOMING EASILY ANNOYED OR IRRITABLE: NOT AT ALL
GAD7 TOTAL SCORE: 0
3. WORRYING TOO MUCH ABOUT DIFFERENT THINGS: NOT AT ALL

## 2025-04-30 ASSESSMENT — PAIN SCALES - GENERAL: PAINLEVEL_OUTOF10: 0-NO PAIN

## 2025-04-30 ASSESSMENT — LIFESTYLE VARIABLES
SKIP TO QUESTIONS 9-10: 1
HOW MANY STANDARD DRINKS CONTAINING ALCOHOL DO YOU HAVE ON A TYPICAL DAY: PATIENT DOES NOT DRINK
HOW OFTEN DO YOU HAVE A DRINK CONTAINING ALCOHOL: NEVER
AUDIT-C TOTAL SCORE: 0
HOW OFTEN DO YOU HAVE SIX OR MORE DRINKS ON ONE OCCASION: NEVER

## 2025-04-30 ASSESSMENT — COLUMBIA-SUICIDE SEVERITY RATING SCALE - C-SSRS
6. HAVE YOU EVER DONE ANYTHING, STARTED TO DO ANYTHING, OR PREPARED TO DO ANYTHING TO END YOUR LIFE?: NO
2. HAVE YOU ACTUALLY HAD ANY THOUGHTS OF KILLING YOURSELF?: NO
1. IN THE PAST MONTH, HAVE YOU WISHED YOU WERE DEAD OR WISHED YOU COULD GO TO SLEEP AND NOT WAKE UP?: NO

## 2025-04-30 NOTE — PATIENT INSTRUCTIONS
Prevnar 20, is recommended for folks who had the Prevnar 13 booster earlier-anytime in the next year or so.     Same medications, labs are good and bp is great on current meds.    Follow up 6 months.  Sooner if needed.    It was nice to see you today.

## 2025-08-28 ENCOUNTER — OFFICE VISIT (OUTPATIENT)
Dept: URGENT CARE | Age: 75
End: 2025-08-28
Payer: COMMERCIAL

## 2025-08-28 ENCOUNTER — ANCILLARY PROCEDURE (OUTPATIENT)
Dept: URGENT CARE | Age: 75
End: 2025-08-28
Payer: COMMERCIAL

## 2025-08-28 VITALS
BODY MASS INDEX: 31.46 KG/M2 | HEART RATE: 70 BPM | OXYGEN SATURATION: 96 % | RESPIRATION RATE: 17 BRPM | WEIGHT: 172 LBS | SYSTOLIC BLOOD PRESSURE: 108 MMHG | DIASTOLIC BLOOD PRESSURE: 73 MMHG

## 2025-08-28 DIAGNOSIS — S62.102A CLOSED FRACTURE OF LEFT WRIST, INITIAL ENCOUNTER: Primary | ICD-10-CM

## 2025-08-28 DIAGNOSIS — S69.92XA LEFT WRIST INJURY, INITIAL ENCOUNTER: ICD-10-CM

## 2025-08-28 PROCEDURE — 73110 X-RAY EXAM OF WRIST: CPT | Mod: LEFT SIDE | Performed by: FAMILY MEDICINE

## 2025-08-29 ENCOUNTER — APPOINTMENT (OUTPATIENT)
Dept: ORTHOPEDIC SURGERY | Facility: CLINIC | Age: 75
End: 2025-08-29
Payer: COMMERCIAL

## 2025-08-30 ENCOUNTER — ANCILLARY PROCEDURE (OUTPATIENT)
Dept: URGENT CARE | Age: 75
End: 2025-08-30
Payer: COMMERCIAL

## 2025-08-30 ENCOUNTER — OFFICE VISIT (OUTPATIENT)
Dept: URGENT CARE | Age: 75
End: 2025-08-30
Payer: COMMERCIAL

## 2025-08-30 VITALS
RESPIRATION RATE: 20 BRPM | BODY MASS INDEX: 31.01 KG/M2 | OXYGEN SATURATION: 98 % | WEIGHT: 175 LBS | TEMPERATURE: 98 F | DIASTOLIC BLOOD PRESSURE: 88 MMHG | HEART RATE: 88 BPM | HEIGHT: 63 IN | SYSTOLIC BLOOD PRESSURE: 168 MMHG

## 2025-08-30 DIAGNOSIS — S62.91XA HAND FRACTURE, RIGHT, CLOSED, INITIAL ENCOUNTER: ICD-10-CM

## 2025-08-30 DIAGNOSIS — M79.641 RIGHT HAND PAIN: ICD-10-CM

## 2025-08-30 DIAGNOSIS — S52.514A CLOSED NONDISPLACED FRACTURE OF STYLOID PROCESS OF RIGHT RADIUS, INITIAL ENCOUNTER: Primary | ICD-10-CM

## 2025-08-30 ASSESSMENT — ENCOUNTER SYMPTOMS
RESPIRATORY NEGATIVE: 1
HEADACHES: 0
GASTROINTESTINAL NEGATIVE: 1
COLOR CHANGE: 1
CONSTITUTIONAL NEGATIVE: 1
ARTHRALGIAS: 1
NEUROLOGICAL NEGATIVE: 1
WOUND: 0
DIZZINESS: 0
CARDIOVASCULAR NEGATIVE: 1
LIGHT-HEADEDNESS: 0
WEAKNESS: 0
SEIZURES: 0
NUMBNESS: 0
FACIAL ASYMMETRY: 0
PHOTOPHOBIA: 0
JOINT SWELLING: 1
TREMORS: 0
SPEECH DIFFICULTY: 0
MYALGIAS: 1
PSYCHIATRIC NEGATIVE: 1
EYES NEGATIVE: 1

## 2025-08-30 ASSESSMENT — PAIN SCALES - GENERAL
PAINLEVEL_OUTOF10: 5
PAINLEVEL_OUTOF10: 5

## 2025-09-08 ENCOUNTER — APPOINTMENT (OUTPATIENT)
Dept: ORTHOPEDIC SURGERY | Facility: CLINIC | Age: 75
End: 2025-09-08
Payer: COMMERCIAL

## 2025-11-05 ENCOUNTER — APPOINTMENT (OUTPATIENT)
Dept: PRIMARY CARE | Facility: CLINIC | Age: 75
End: 2025-11-05
Payer: COMMERCIAL